# Patient Record
Sex: FEMALE | Race: BLACK OR AFRICAN AMERICAN | Employment: UNEMPLOYED | URBAN - METROPOLITAN AREA
[De-identification: names, ages, dates, MRNs, and addresses within clinical notes are randomized per-mention and may not be internally consistent; named-entity substitution may affect disease eponyms.]

---

## 2016-11-17 LAB
CHLAMYDIA, EXTERNAL: NORMAL
N. GONORRHEA, EXTERNAL: NORMAL

## 2016-12-09 LAB
HBSAG, EXTERNAL: NORMAL
HIV, EXTERNAL: NONREACTIVE
RUBELLA, EXTERNAL: NORMAL
TYPE, ABO & RH, EXTERNAL: NORMAL

## 2017-03-30 LAB
ANTIBODY SCREEN, EXTERNAL: NORMAL
T. PALLIDUM, EXTERNAL: NORMAL

## 2017-05-26 LAB — GRBS, EXTERNAL: NORMAL

## 2017-06-15 ENCOUNTER — ANESTHESIA (OUTPATIENT)
Dept: LABOR AND DELIVERY | Age: 27
DRG: 540 | End: 2017-06-15
Payer: MEDICAID

## 2017-06-15 ENCOUNTER — HOSPITAL ENCOUNTER (INPATIENT)
Age: 27
LOS: 2 days | Discharge: HOME | DRG: 540 | End: 2017-06-17
Attending: OBSTETRICS & GYNECOLOGY | Admitting: OBSTETRICS & GYNECOLOGY
Payer: MEDICAID

## 2017-06-15 ENCOUNTER — ANESTHESIA EVENT (OUTPATIENT)
Dept: LABOR AND DELIVERY | Age: 27
DRG: 540 | End: 2017-06-15
Payer: MEDICAID

## 2017-06-15 LAB
ABO + RH BLD: NORMAL
BASOPHILS # BLD AUTO: 0 K/UL (ref 0–0.1)
BASOPHILS # BLD: 0 % (ref 0–1)
BLOOD GROUP ANTIBODIES SERPL: NORMAL
EOSINOPHIL # BLD: 0 K/UL (ref 0–0.4)
EOSINOPHIL NFR BLD: 1 % (ref 0–7)
ERYTHROCYTE [DISTWIDTH] IN BLOOD BY AUTOMATED COUNT: 13.7 % (ref 11.5–14.5)
HCT VFR BLD AUTO: 34.3 % (ref 35–47)
HGB BLD-MCNC: 11.6 G/DL (ref 11.5–16)
LYMPHOCYTES # BLD AUTO: 26 % (ref 12–49)
LYMPHOCYTES # BLD: 1.8 K/UL (ref 0.8–3.5)
MCH RBC QN AUTO: 29 PG (ref 26–34)
MCHC RBC AUTO-ENTMCNC: 33.8 G/DL (ref 30–36.5)
MCV RBC AUTO: 85.8 FL (ref 80–99)
MONOCYTES # BLD: 0.6 K/UL (ref 0–1)
MONOCYTES NFR BLD AUTO: 8 % (ref 5–13)
NEUTS SEG # BLD: 4.7 K/UL (ref 1.8–8)
NEUTS SEG NFR BLD AUTO: 65 % (ref 32–75)
PLATELET # BLD AUTO: 161 K/UL (ref 150–400)
RBC # BLD AUTO: 4 M/UL (ref 3.8–5.2)
SPECIMEN EXP DATE BLD: NORMAL
WBC # BLD AUTO: 7.2 K/UL (ref 3.6–11)

## 2017-06-15 PROCEDURE — 74011000250 HC RX REV CODE- 250

## 2017-06-15 PROCEDURE — 74011000258 HC RX REV CODE- 258

## 2017-06-15 PROCEDURE — 75410000003 HC RECOV DEL/VAG/CSECN EA 0.5 HR

## 2017-06-15 PROCEDURE — 77030003666 HC NDL SPINAL BD -A: Performed by: ANESTHESIOLOGY

## 2017-06-15 PROCEDURE — 76010000392 HC C SECN EA ADDL 0.5 HR: Performed by: OBSTETRICS & GYNECOLOGY

## 2017-06-15 PROCEDURE — 86900 BLOOD TYPING SEROLOGIC ABO: CPT | Performed by: OBSTETRICS & GYNECOLOGY

## 2017-06-15 PROCEDURE — 65410000002 HC RM PRIVATE OB

## 2017-06-15 PROCEDURE — 74011250636 HC RX REV CODE- 250/636

## 2017-06-15 PROCEDURE — 74011250637 HC RX REV CODE- 250/637: Performed by: OBSTETRICS & GYNECOLOGY

## 2017-06-15 PROCEDURE — 77030032490 HC SLV COMPR SCD KNE COVD -B

## 2017-06-15 PROCEDURE — 85025 COMPLETE CBC W/AUTO DIFF WBC: CPT | Performed by: OBSTETRICS & GYNECOLOGY

## 2017-06-15 PROCEDURE — 36415 COLL VENOUS BLD VENIPUNCTURE: CPT | Performed by: OBSTETRICS & GYNECOLOGY

## 2017-06-15 PROCEDURE — 77030020061 HC IV BLD WRMR ADMIN SET 3M -B

## 2017-06-15 PROCEDURE — 76060000078 HC EPIDURAL ANESTHESIA: Performed by: OBSTETRICS & GYNECOLOGY

## 2017-06-15 PROCEDURE — 75410000002 HC LABOR FEE PER 1 HR

## 2017-06-15 PROCEDURE — 74011250636 HC RX REV CODE- 250/636: Performed by: OBSTETRICS & GYNECOLOGY

## 2017-06-15 PROCEDURE — 77030034849

## 2017-06-15 PROCEDURE — 77030014125 HC TY EPDRL BBMI -B: Performed by: ANESTHESIOLOGY

## 2017-06-15 PROCEDURE — 76010000391 HC C SECN FIRST 1 HR: Performed by: OBSTETRICS & GYNECOLOGY

## 2017-06-15 PROCEDURE — 74011000258 HC RX REV CODE- 258: Performed by: OBSTETRICS & GYNECOLOGY

## 2017-06-15 PROCEDURE — 77010026065 HC OXYGEN MINIMUM MEDICAL AIR

## 2017-06-15 PROCEDURE — 76060000033 HC ANESTHESIA 1 TO 1.5 HR: Performed by: OBSTETRICS & GYNECOLOGY

## 2017-06-15 RX ORDER — NALOXONE HYDROCHLORIDE 0.4 MG/ML
0.2 INJECTION, SOLUTION INTRAMUSCULAR; INTRAVENOUS; SUBCUTANEOUS
Status: DISCONTINUED | OUTPATIENT
Start: 2017-06-15 | End: 2017-06-17 | Stop reason: HOSPADM

## 2017-06-15 RX ORDER — DIPHENHYDRAMINE HCL 25 MG
25 CAPSULE ORAL
Status: DISCONTINUED | OUTPATIENT
Start: 2017-06-15 | End: 2017-06-17 | Stop reason: HOSPADM

## 2017-06-15 RX ORDER — SODIUM CHLORIDE, SODIUM LACTATE, POTASSIUM CHLORIDE, CALCIUM CHLORIDE 600; 310; 30; 20 MG/100ML; MG/100ML; MG/100ML; MG/100ML
1000 INJECTION, SOLUTION INTRAVENOUS CONTINUOUS
Status: DISCONTINUED | OUTPATIENT
Start: 2017-06-15 | End: 2017-06-15 | Stop reason: HOSPADM

## 2017-06-15 RX ORDER — ACETAMINOPHEN 325 MG/1
650 TABLET ORAL
Status: DISCONTINUED | OUTPATIENT
Start: 2017-06-15 | End: 2017-06-17 | Stop reason: HOSPADM

## 2017-06-15 RX ORDER — DOCUSATE SODIUM 100 MG/1
100 CAPSULE, LIQUID FILLED ORAL 2 TIMES DAILY
Status: DISCONTINUED | OUTPATIENT
Start: 2017-06-15 | End: 2017-06-17 | Stop reason: HOSPADM

## 2017-06-15 RX ORDER — SODIUM CHLORIDE 0.9 % (FLUSH) 0.9 %
5-10 SYRINGE (ML) INJECTION EVERY 8 HOURS
Status: DISCONTINUED | OUTPATIENT
Start: 2017-06-15 | End: 2017-06-15 | Stop reason: HOSPADM

## 2017-06-15 RX ORDER — SODIUM CHLORIDE, SODIUM LACTATE, POTASSIUM CHLORIDE, CALCIUM CHLORIDE 600; 310; 30; 20 MG/100ML; MG/100ML; MG/100ML; MG/100ML
125 INJECTION, SOLUTION INTRAVENOUS CONTINUOUS
Status: DISCONTINUED | OUTPATIENT
Start: 2017-06-15 | End: 2017-06-17 | Stop reason: HOSPADM

## 2017-06-15 RX ORDER — SODIUM CHLORIDE, SODIUM LACTATE, POTASSIUM CHLORIDE, CALCIUM CHLORIDE 600; 310; 30; 20 MG/100ML; MG/100ML; MG/100ML; MG/100ML
INJECTION, SOLUTION INTRAVENOUS
Status: DISCONTINUED | OUTPATIENT
Start: 2017-06-15 | End: 2017-06-15 | Stop reason: HOSPADM

## 2017-06-15 RX ORDER — NALOXONE HYDROCHLORIDE 0.4 MG/ML
0.4 INJECTION, SOLUTION INTRAMUSCULAR; INTRAVENOUS; SUBCUTANEOUS AS NEEDED
Status: DISCONTINUED | OUTPATIENT
Start: 2017-06-15 | End: 2017-06-17 | Stop reason: HOSPADM

## 2017-06-15 RX ORDER — MORPHINE SULFATE 0.5 MG/ML
INJECTION, SOLUTION EPIDURAL; INTRATHECAL; INTRAVENOUS AS NEEDED
Status: DISCONTINUED | OUTPATIENT
Start: 2017-06-15 | End: 2017-06-15 | Stop reason: HOSPADM

## 2017-06-15 RX ORDER — SODIUM CHLORIDE 0.9 % (FLUSH) 0.9 %
5-10 SYRINGE (ML) INJECTION AS NEEDED
Status: DISCONTINUED | OUTPATIENT
Start: 2017-06-15 | End: 2017-06-15 | Stop reason: HOSPADM

## 2017-06-15 RX ORDER — IBUPROFEN 400 MG/1
800 TABLET ORAL EVERY 8 HOURS
Status: DISCONTINUED | OUTPATIENT
Start: 2017-06-15 | End: 2017-06-17 | Stop reason: HOSPADM

## 2017-06-15 RX ORDER — ACETAMINOPHEN 10 MG/ML
INJECTION, SOLUTION INTRAVENOUS AS NEEDED
Status: DISCONTINUED | OUTPATIENT
Start: 2017-06-15 | End: 2017-06-15 | Stop reason: HOSPADM

## 2017-06-15 RX ORDER — BUPIVACAINE HYDROCHLORIDE 7.5 MG/ML
INJECTION, SOLUTION EPIDURAL; RETROBULBAR AS NEEDED
Status: DISCONTINUED | OUTPATIENT
Start: 2017-06-15 | End: 2017-06-15 | Stop reason: HOSPADM

## 2017-06-15 RX ORDER — ONDANSETRON 2 MG/ML
INJECTION INTRAMUSCULAR; INTRAVENOUS AS NEEDED
Status: DISCONTINUED | OUTPATIENT
Start: 2017-06-15 | End: 2017-06-15 | Stop reason: HOSPADM

## 2017-06-15 RX ORDER — SIMETHICONE 80 MG
80 TABLET,CHEWABLE ORAL AS NEEDED
Status: DISCONTINUED | OUTPATIENT
Start: 2017-06-15 | End: 2017-06-17 | Stop reason: HOSPADM

## 2017-06-15 RX ORDER — SODIUM CHLORIDE 0.9 % (FLUSH) 0.9 %
5-10 SYRINGE (ML) INJECTION EVERY 8 HOURS
Status: DISCONTINUED | OUTPATIENT
Start: 2017-06-15 | End: 2017-06-17 | Stop reason: HOSPADM

## 2017-06-15 RX ORDER — OXYTOCIN/RINGER'S LACTATE 20/1000 ML
125-500 PLASTIC BAG, INJECTION (ML) INTRAVENOUS ONCE
Status: ACTIVE | OUTPATIENT
Start: 2017-06-15 | End: 2017-06-15

## 2017-06-15 RX ORDER — OXYCODONE AND ACETAMINOPHEN 5; 325 MG/1; MG/1
1 TABLET ORAL
Status: DISCONTINUED | OUTPATIENT
Start: 2017-06-15 | End: 2017-06-17 | Stop reason: HOSPADM

## 2017-06-15 RX ORDER — SODIUM CHLORIDE 0.9 % (FLUSH) 0.9 %
5-10 SYRINGE (ML) INJECTION AS NEEDED
Status: DISCONTINUED | OUTPATIENT
Start: 2017-06-15 | End: 2017-06-17 | Stop reason: HOSPADM

## 2017-06-15 RX ORDER — OXYTOCIN 10 [USP'U]/ML
INJECTION, SOLUTION INTRAMUSCULAR; INTRAVENOUS AS NEEDED
Status: DISCONTINUED | OUTPATIENT
Start: 2017-06-15 | End: 2017-06-15 | Stop reason: HOSPADM

## 2017-06-15 RX ADMIN — ONDANSETRON 4 MG: 2 INJECTION INTRAMUSCULAR; INTRAVENOUS at 08:38

## 2017-06-15 RX ADMIN — IBUPROFEN 800 MG: 400 TABLET ORAL at 17:39

## 2017-06-15 RX ADMIN — MORPHINE SULFATE 0.5 MG: 0.5 INJECTION, SOLUTION EPIDURAL; INTRATHECAL; INTRAVENOUS at 08:16

## 2017-06-15 RX ADMIN — SODIUM CHLORIDE, SODIUM LACTATE, POTASSIUM CHLORIDE, CALCIUM CHLORIDE: 600; 310; 30; 20 INJECTION, SOLUTION INTRAVENOUS at 09:06

## 2017-06-15 RX ADMIN — DOCUSATE SODIUM 100 MG: 100 CAPSULE, LIQUID FILLED ORAL at 17:39

## 2017-06-15 RX ADMIN — SODIUM CHLORIDE, SODIUM LACTATE, POTASSIUM CHLORIDE, AND CALCIUM CHLORIDE 1000 ML: 600; 310; 30; 20 INJECTION, SOLUTION INTRAVENOUS at 06:35

## 2017-06-15 RX ADMIN — BUPIVACAINE HYDROCHLORIDE 1.4 ML: 7.5 INJECTION, SOLUTION EPIDURAL; RETROBULBAR at 08:16

## 2017-06-15 RX ADMIN — SODIUM CHLORIDE, SODIUM LACTATE, POTASSIUM CHLORIDE, AND CALCIUM CHLORIDE 1000 ML: 600; 310; 30; 20 INJECTION, SOLUTION INTRAVENOUS at 07:39

## 2017-06-15 RX ADMIN — SODIUM CHLORIDE, SODIUM LACTATE, POTASSIUM CHLORIDE, AND CALCIUM CHLORIDE 125 ML/HR: 600; 310; 30; 20 INJECTION, SOLUTION INTRAVENOUS at 22:43

## 2017-06-15 RX ADMIN — OXYTOCIN 20 UNITS: 10 INJECTION, SOLUTION INTRAMUSCULAR; INTRAVENOUS at 08:38

## 2017-06-15 RX ADMIN — SODIUM CHLORIDE, SODIUM LACTATE, POTASSIUM CHLORIDE, CALCIUM CHLORIDE: 600; 310; 30; 20 INJECTION, SOLUTION INTRAVENOUS at 08:05

## 2017-06-15 RX ADMIN — ACETAMINOPHEN 1000 MG: 10 INJECTION, SOLUTION INTRAVENOUS at 08:54

## 2017-06-15 RX ADMIN — SODIUM CHLORIDE, SODIUM LACTATE, POTASSIUM CHLORIDE, AND CALCIUM CHLORIDE 125 ML/HR: 600; 310; 30; 20 INJECTION, SOLUTION INTRAVENOUS at 14:48

## 2017-06-15 RX ADMIN — CEFAZOLIN SODIUM 1 G: 1 INJECTION, POWDER, FOR SOLUTION INTRAMUSCULAR; INTRAVENOUS at 07:39

## 2017-06-15 NOTE — OP NOTES
Operative Note    Name: Jessica Lloyd   Medical Record Number: [id number]   YOB: 1990  Today's Date: Olivia 15, 2017      Pre-operative Diagnosis: IUP at 39/0wks  Previous C/Section    Post-operative Diagnosis:  IUP at 39/0wks  Previous C/Section    Procedure: Repeat low transverse  section     Surgeon(s):  MD Marcelo Whitfield MD    Anesthesia: Spinal    Prophylactic Antibiotics: Ancef    DVT Prophylaxis: Sequential Compression Devices     Fetal Description: yanez     Birth Information:   Information for the patient's :  Yakelin Styles [[id number]]   Delivery of a 2.945 kg Male [2] infant on 6/15/2017 at 8:37 AM. Apgars were 9 and . Umbilical Cord:     Umbilical Cord Events:     Placenta:  removal with  appearance. Amniotic Fluid Volume:        Amniotic Fluid Description:           Placenta:  expressed Expressed    Estimated Blood Loss (ml):  800mL    Specimens: None           Complications:  none    Procedure Detail:      After proper patient identification and consent, the patient was taken to the operating room, where spinal anesthesia was administered. The patient was placed in the dorsal supine position with a leftward tilt. A larsen catheter was placed using sterile technique. SCDs were applied and activated. The patient was prepped and draped in the normal sterile fashion. Anesthesia was tested and found to be adequate. A time-out was called and all parties were in agreement. The abdomen was entered using the Pfannenstiel technique. There were minimal fascial adhesions. The rectus muscles were adhesed in the midline and taken down sharply. The peritoneum was entered sharply well superior to the bladder without any apparent injury. An Justyn retractor was placed. A bladder flap was created. A low transverse uterine incision was made with the scalpel and extended with blunt finger dissection by pulling in a cephalo-caudid direction.  Amniotomy was performed and the fluid was medium amount clear. The babys head and body was then delivered atraumatically. The cord was clamped and cut and the baby was handed off to Nursing staff in attendance. The placenta was then removed from the uterus. The uterus was curettaged with a moist lap pad and cleared of all clots and debris. The uterine incision was closed with 0 monocryl in a running locking fashion. A second layer of the same suture was imbricated over the first. Excellent hemostasis was assured. The pelvis was liberally irrigated and suctioned. The undersides of the fascia were examined and made hemostatic with cautery. There was a button-hole in the anterior fascial edge. This was repaired with running 0-vicryl. The fascia was re-approximated with running 0-vicryl. The subcutaneous tissue was irrigated and suctioned. It was made hemostatic with cautery. The skin was closed with subcuticular monocryl staples and covered with a sterile dressing. This concluded the procedure. All sponge, instrument, and needle counts were correct times three. Mother and baby were taken to recovery/postpartum room in stable condition.     Mary Azul MD  Olivia 15, 2017  9:31 AM

## 2017-06-15 NOTE — PROGRESS NOTES
TRANSFER - OUT REPORT:    Verbal report given to [employer], RN(name) on Radha Miller  being transferred to MIU(unit) for routine post - op       Report consisted of patients Situation, Background, Assessment and   Recommendations(SBAR). Information from the following report(s) SBAR, Kardex, OR Summary, Procedure Summary, Intake/Output, MAR and Recent Results was reviewed with the receiving nurse. Lines:   Peripheral IV 06/15/17 Left Hand (Active)   Site Assessment Clean, dry, & intact 6/15/2017  6:37 AM   Phlebitis Assessment 0 6/15/2017  6:37 AM   Infiltration Assessment 0 6/15/2017  6:37 AM   Dressing Status Clean, dry, & intact 6/15/2017  6:37 AM   Dressing Type Tape;Transparent 6/15/2017  6:37 AM   Hub Color/Line Status Pink; Infusing 6/15/2017  6:37 AM   Action Taken Blood drawn 6/15/2017  6:37 AM        Opportunity for questions and clarification was provided.

## 2017-06-15 NOTE — IP AVS SNAPSHOT
Current Discharge Medication List  
  
START taking these medications Dose & Instructions Dispensing Information Comments Morning Noon Evening Bedtime  
 ferrous sulfate 142 mg (45 mg iron) ER tablet Commonly known as:  SLOW FE  
   
Your last dose was: Your next dose is:    
   
   
 Dose:  142 mg Take 1 Tab by mouth Daily (before breakfast). Quantity:  30 Tab Refills:  1  
     
   
   
   
  
 ibuprofen 600 mg tablet Commonly known as:  MOTRIN Your last dose was: Your next dose is:    
   
   
 Dose:  600 mg Take 1 Tab by mouth every six (6) hours as needed for Pain for up to 360 days. Quantity:  30 Tab Refills:  0  
     
   
   
   
  
 oxyCODONE-acetaminophen 5-325 mg per tablet Commonly known as:  PERCOCET Your last dose was: Your next dose is:    
   
   
 Dose:  1-2 Tab Take 1-2 Tabs by mouth every four (4) hours as needed for Pain. Max Daily Amount: 12 Tabs. Quantity:  30 Tab Refills:  0 CONTINUE these medications which have NOT CHANGED Dose & Instructions Dispensing Information Comments Morning Noon Evening Bedtime  
 prenatal multivit-ca-min-fe-fa Tab Commonly known as:  PRENATAL VITAMIN Your last dose was: Your next dose is:    
   
   
 Dose:  1 Tab Take 1 Tab by mouth daily. Quantity:  30 Tab Refills:  9 Where to Get Your Medications Information on where to get these meds will be given to you by the nurse or doctor. ! Ask your nurse or doctor about these medications  
  ferrous sulfate 142 mg (45 mg iron) ER tablet  
 ibuprofen 600 mg tablet  
 oxyCODONE-acetaminophen 5-325 mg per tablet

## 2017-06-15 NOTE — PROGRESS NOTES
31 yo  arrived for scheduled repeat . Pt reports positive FM, denies VB or LOF.    0600 - Consents reviewed and signed. 3572 - EFM and toco placed. 1923 - abdomen clipped and CHG wipes by Dulce Stubbs RN.    6393 - Dr. Cheryl Quiorz in room    0700 - lab called, states T&S hemolyzed, requiring repeat. Labs drawn peripherally (with extra green and purple top) and sent to lab.    0703 - Verbal bedside shift report to [address] [facility]. Turned over care of pt at this time.

## 2017-06-15 NOTE — L&D DELIVERY NOTE
Delivery Summary  Patient: Shankar Ray             Circumcision:   desires  Additional Delivery Comments - Uncomplicated repeat CS. Male (Viky Gentile) 6-7. Information for the patient's :  Lum Rule [[id number]]       Labor Events:    Labor: No   Rupture Date:     Rupture Time:     Rupture Type     Amniotic Fluid Volume:      Amniotic Fluid Description:       Induction:         Augmentation:     Labor Events:       Cervical Ripening:     None     Delivery Events:  Episiotomy: None   Laceration(s): None     Repaired:      Number of Repair Packets:     Suture Type and Size:       Estimated Blood Loss (ml):  ml       Delivery Date: 6/15/2017    Delivery Time: 8:37 AM  Delivery Type: , Low Transverse  Sex:  Male     Gestational Age: 39w0d   Delivery Clinician:  Selma Joe  Living Status: Yes   Delivery Location: OR            APGARS  One minute Five minutes Ten minutes   Skin color: 1   1        Heart rate: 2   2        Grimace: 2   2        Muscle tone: 2   2        Breathin           Totals: 9               Presentation: Vertex    Position:        Resuscitation Method:  Suctioning-bulb; Tactile Stimulation     Meconium Stained: None      Cord Vessels: 3 Vessels      Cord Events:    Cord Blood Sent?:  No    Blood Gases Sent?:  No    Placenta:  Date/Time: 6/15  8:38 AM  Removal: Manual Removal      Appearance: Intact     [place] Measurements:  Birth Weight: 2.945 kg      Birth Length: 49.5 cm      Head Circumference: 35 cm      Chest Circumference: 32.5 cm     Abdominal Girth: 28.5 cm    Other Providers:   Joy HOANG;RONALD ELI;MYRNA HARDY;;;DEANGELO CORRALES;MARGE WAGGONER;;;BELA HUA;BETTY GERMAIN, Obstetrician;Primary Nurse;Primary [place] Nurse;Nicu Nurse;Neonatologist;Anesthesiologist;Crna;Nurse Practitioner;Midwife;Nursery Nurse;Scrub Tech           Cord pH:  none    Episiotomy: None   Laceration(s): None      Estimated Blood Loss (ml):     Labor Events  Method:         Augmentation:     Cervical Ripening:       None        Operative Vaginal Delivery - none    Group B Strep:   Lab Results   Component Value Date/Time    GrBStrep, External neg 2017     Information for the patient's :  Veronica Pelayo lauri [[id number]]   No results found for: ABORH, PCTABR, PCTDIG, BILI, ABORHEXT, ABORH    No results found for: APH, APCO2, APO2, AHCO3, ABEC, ABDC, O2ST, EPHV, PCO2V, PO2V, HCO3V, EBEV, EBDV, SITE, RSCOM

## 2017-06-15 NOTE — ROUTINE PROCESS
Bedside and Verbal shift change report given to SURINDER Gonzales RN (oncoming nurse) by Anabel Kelley RN (offgoing nurse). Report included the following information SBAR.

## 2017-06-15 NOTE — IP AVS SNAPSHOT
[address] [facility] 
[phone] Patient: Rafal Stewart MRN: [id number] ZO:[birth year] You are allergic to the following Allergen Reactions Naproxen Swelling Recent Documentation Height Weight Breastfeeding? BMI OB Status Smoking Status 1.575 m 79.8 kg Unknown 32.19 kg/m2 Recent pregnancy Never Smoker Emergency Contacts Name Discharge Info Relation Home Work Mobile KielylFlores kaye  Parent [1] [phone] Flores Del Toro DISCHARGE CAREGIVER [3] Parent [1] [phone] About your hospitalization You were admitted on:  Olivia 15, 2017 You last received care in the:  [facility] You were discharged on:  June 17, 2017 Unit phone number:  [phone] Why you were hospitalized Your primary diagnosis was:  Not on File Your diagnoses also included:  Pregnancy Providers Seen During Your Hospitalizations Provider Role Specialty Primary office phone Lenard Bains MD Attending Provider Obstetrics & Gynecology [phone] Your Primary Care Physician (PCP) Primary Care Physician Office Phone Office Fax OTHER, PHYS ** None ** ** None ** Follow-up Information Follow up With Details Comments Contact Info My Ruffin MD   Patient can only remember the practice name and not the physician Current Discharge Medication List  
  
START taking these medications Dose & Instructions Dispensing Information Comments Morning Noon Evening Bedtime  
 ferrous sulfate 142 mg (45 mg iron) ER tablet Commonly known as:  SLOW FE  
   
Your last dose was: Your next dose is:    
   
   
 Dose:  142 mg Take 1 Tab by mouth Daily (before breakfast). Quantity:  30 Tab Refills:  1  
     
   
   
   
  
 ibuprofen 600 mg tablet Commonly known as:  MOTRIN Your last dose was: Your next dose is:    
   
   
 Dose:  600 mg Take 1 Tab by mouth every six (6) hours as needed for Pain for up to 360 days. Quantity:  30 Tab Refills:  0  
     
   
   
   
  
 oxyCODONE-acetaminophen 5-325 mg per tablet Commonly known as:  PERCOCET Your last dose was: Your next dose is:    
   
   
 Dose:  1-2 Tab Take 1-2 Tabs by mouth every four (4) hours as needed for Pain. Max Daily Amount: 12 Tabs. Quantity:  30 Tab Refills:  0 CONTINUE these medications which have NOT CHANGED Dose & Instructions Dispensing Information Comments Morning Noon Evening Bedtime  
 prenatal multivit-ca-min-fe-fa Tab Commonly known as:  PRENATAL VITAMIN Your last dose was: Your next dose is:    
   
   
 Dose:  1 Tab Take 1 Tab by mouth daily. Quantity:  30 Tab Refills:  9 Where to Get Your Medications Information on where to get these meds will be given to you by the nurse or doctor. ! Ask your nurse or doctor about these medications  
  ferrous sulfate 142 mg (45 mg iron) ER tablet  
 ibuprofen 600 mg tablet  
 oxyCODONE-acetaminophen 5-325 mg per tablet Discharge Instructions  Section: What to Expect at [facility] Your Recovery A  section, or , is surgery to deliver your baby through a cut, called an incision, that the doctor makes in your lower belly and uterus. You may have some pain in your lower belly and need pain medicine for 1 to 2 weeks. You can expect some vaginal bleeding for several weeks. You will probably need about 6 weeks to fully recover. It is important to take it easy while the incision is healing. Avoid heavy lifting, strenuous activities, or exercises that strain the belly muscles while you are recovering. Ask a family member or friend for help with housework, cooking, and shopping. This care sheet gives you a general idea about how long it will take for you to recover. But each person recovers at a different pace. Follow the steps below to get better as quickly as possible. How can you care for yourself at home? Activity · Rest when you feel tired. Getting enough sleep will help you recover. · Try to walk each day. Start by walking a little more than you did the day before. Bit by bit, increase the amount you walk. Walking boosts blood flow and helps prevent pneumonia, constipation, and blood clots. · Avoid strenuous activities, such as bicycle riding, jogging, weightlifting, and aerobic exercise, for 6 weeks or until your doctor says it is okay. · Until your doctor says it is okay, do not lift anything heavier than your baby. · Do not do sit-ups or other exercises that strain the belly muscles for 6 weeks or until your doctor says it is okay. · Hold a pillow over your incision when you cough or take deep breaths. This will support your belly and decrease your pain. · You may shower as usual. Pat the incision dry when you are done. · You will have some vaginal bleeding. Wear sanitary pads. Do not douche or use tampons until your doctor says it is okay. · Ask your doctor when you can drive again. · You will probably need to take at least 6 weeks off work. It depends on the type of work you do and how you feel. · Ask your doctor when it is okay for you to have sex. Diet · You can eat your normal diet. If your stomach is upset, try bland, low-fat foods like plain rice, broiled chicken, toast, and yogurt. · Drink plenty of fluids (unless your doctor tells you not to). · You may notice that your bowel movements are not regular right after your surgery. This is common. Try to avoid constipation and straining with bowel movements. You may want to take a fiber supplement every day.  If you have not had a bowel movement after a couple of days, ask your doctor about taking a mild laxative. · If you are breastfeeding, do not drink any alcohol. Medicines · Your doctor will tell you if and when you can restart your medicines. He or she will also give you instructions about taking any new medicines. · If you take blood thinners, such as warfarin (Coumadin), clopidogrel (Plavix), or aspirin, be sure to talk to your doctor. He or she will tell you if and when to start taking those medicines again. Make sure that you understand exactly what your doctor wants you to do. · Take pain medicines exactly as directed. ¨ If the doctor gave you a prescription medicine for pain, take it as prescribed. ¨ If you are not taking a prescription pain medicine, ask your doctor if you can take an over-the-counter medicine. · If you think your pain medicine is making you sick to your stomach: 
¨ Take your medicine after meals (unless your doctor has told you not to). ¨ Ask your doctor for a different pain medicine. · If your doctor prescribed antibiotics, take them as directed. Do not stop taking them just because you feel better. You need to take the full course of antibiotics. Incision care · If you have strips of tape on the incision, leave the tape on for a week or until it falls off. · Wash the area daily with warm, soapy water, and pat it dry. Don't use hydrogen peroxide or alcohol, which can slow healing. You may cover the area with a gauze bandage if it weeps or rubs against clothing. Change the bandage every day. · Keep the area clean and dry. Other instructions · If you breastfeed your baby, you may be more comfortable while you are healing if you place the baby so that he or she is not resting on your belly. Try tucking your baby under your arm, with his or her body along the side you will be feeding on. Support your baby's upper body with your arm.  With that hand you can control your baby's head to bring his or her mouth to your breast. This is sometimes called the football hold. Follow-up care is a key part of your treatment and safety. Be sure to make and go to all appointments, and call your doctor if you are having problems. It's also a good idea to know your test results and keep a list of the medicines you take. When should you call for help? Call 911 anytime you think you may need emergency care. For example, call if: 
· You passed out (lost consciousness). · You have symptoms of a blood clot in your lung (called a pulmonary embolism). These may include: 
¨ Sudden chest pain. ¨ Trouble breathing. ¨ Coughing up blood. · You have thoughts of harming yourself, your baby, or another person. Call your doctor now or seek immediate medical care if: 
· You have severe vaginal bleeding. This means that you are soaking through a pad every hour for 2 or more hours. · You are dizzy or lightheaded, or you feel like you may faint. · You have new or more belly pain. · You have loose stitches, or your incision comes open. · You have symptoms of infection, such as: 
¨ Increased pain, swelling, warmth, or redness. ¨ Red streaks leading from the incision. ¨ Pus draining from the incision. ¨ A fever. · You have symptoms of a blood clot in your leg (called a deep vein thrombosis), such as: 
¨ Pain in your calf, back of the knee, thigh, or groin. ¨ Redness and swelling in your leg or groin. Watch closely for changes in your health, and be sure to contact your doctor if: 
· You feel sad, anxious, or hopeless for more than a few days. · You do not get better as expected. Where can you learn more? Go to http://sandra-halima.info/. Enter M806 in the search box to learn more about \" Section: What to Expect at Home. \" Current as of: May 30, 2016 Content Version: 11.2 © 6645-2460 [employer].  Care instructions adapted under license by [facility] [address] (which disclaims liability or warranty for this information). If you have questions about a medical condition or this instruction, always ask your healthcare professional. [address] any warranty or liability for your use of this information. POST DELIVERY DISCHARGE INSTRUCTIONS Name: Emelina Kolb YOB: 1990 Primary Diagnosis: Active Problems: * No active hospital problems. * General:  
 
Diet/Diet Restrictions: 
· Eight 8-ounce glasses of fluid daily (water, juices); avoid excessive caffeine intake. · Meals/snacks as desired which are high in fiber and carbohydrates and low in fat and cholesterol. Medications:  
 
 
 
Physical Activity / Restrictions / Safety: · Avoid heavy lifting, no more that 8 lbs. For 2-3 weeks;  
· Limit use of stairs to 2 times daily for the first week home. · No driving for one week. · Avoid intercourse 4-6 weeks, no douching or tampon use. · Check with obstetrician before starting or resuming an exercise program.   
 
Discharge Instructions/Special Treatment/Home Care Needs:  
 
· Continue prenatal vitamins. · Continue to use squirt bottle with warm water on your episiotomy after each bathroom use until bleeding stops. · If steri-strips applied to your incision, remove in 7-10 days. Call your doctor for the following: · Fever over 101 degrees by mouth. · Vaginal bleeding heavier than a normal menstrual period or clots larger than a golf ball. · Red streaks or increased swelling of legs, painful red streaks on your breast. 
· Painful urination, constipation and increased pain or swelling or discharge with your incision. · If you feel extremely anxious or overwhelmed. · If you have thoughts of harming yourself and/or your baby. Pain Management:  
 
· Take Acetaminophen (Tylenol) or Ibuprofen (Advil, Motrin), as directed for pain. · Use a warm Sitz bath 3 times daily to relieve episiotomy or hemorrhoidal discomfort. · For hemorrhoidal discomfort, use Tucks and Anusol cream as needed and directed. · Heating pad to  incision as needed. Follow-Up Care:  
 
Appointment with MD: Follow-up Appointments Procedures  FOLLOW UP VISIT Appointment in: 6 Weeks With Dr. Funmilayo Guillen With Dr. Funmilayo Guillen Standing Status:   Standing Number of Occurrences:   1 Order Specific Question:   Appointment in Answer:   6 Weeks Telephone number: [phone] Signed By: Julia Childers MD                                                                                                   Date: 2017 Time: 8:19 AM 
 
 
Discharge Orders None [email or web address] Announcement We are excited to announce that we are making your provider's discharge notes available to you in [email or web address]. You will see these notes when they are completed and signed by the physician that discharged you from your recent hospital stay. If you have any questions or concerns about any information you see in [email or web address], please call the Health Information Department where you were seen or reach out to your Primary Care Provider for more information about your plan of care. Introducing [facility]! Berto Disla introduces [email or web address] patient portal. Now you can access parts of your medical record, email your doctor's office, and request medication refills online. 1. In your internet browser, go to [email or web address] 2. Click on the First Time User? Click Here link in the Sign In box. You will see the New Member Sign Up page. 3. Enter your [email or web address] Access Code exactly as it appears below. You will not need to use this code after youve completed the sign-up process. If you do not sign up before the expiration date, you must request a new code. · [email or web address] Access Code: [email or web address] Expires: 9/15/2017  9:02 AM 
 
 4. Enter the last four digits of your Social Security Number (xxxx) and Date of Birth (mm/dd/yyyy) as indicated and click Submit. You will be taken to the next sign-up page. 5. Create a [employer] ID. This will be your [employer] login ID and cannot be changed, so think of one that is secure and easy to remember. 6. Create a [employer] password. You can change your password at any time. 7. Enter your Password Reset Question and Answer. This can be used at a later time if you forget your password. 8. Enter your e-mail address. You will receive e-mail notification when new information is available in [address] 9. Click Sign Up. You can now view and download portions of your medical record. 10. Click the Download Summary menu link to download a portable copy of your medical information. If you have questions, please visit the Frequently Asked Questions section of the [employer] website. Remember, [employer] is NOT to be used for urgent needs. For medical emergencies, dial 911. Now available from your iPhone and Android! General Information Please provide this summary of care documentation to your next provider. Patient Signature:  ____________________________________________________________ Date:  ____________________________________________________________  
  
Fransisco Cons Provider Signature:  ____________________________________________________________ Date:  ____________________________________________________________

## 2017-06-15 NOTE — ANESTHESIA POSTPROCEDURE EVALUATION
Post-Anesthesia Evaluation and Assessment    Patient: Raymona Bloch MRN: [id number]  SSN: [id number]    YOB: 1990  Age: 32 y.o. Sex: female       Cardiovascular Function/Vital Signs  Visit Vitals    /58    Pulse 75    Temp 36.4 °C (97.5 °F)    Resp 16    Ht 5' 2\" (1.575 m)    Wt 79.8 kg (176 lb)    SpO2 100%    Breastfeeding Unknown    BMI 32.19 kg/m2       Patient is status post spinal anesthesia for Procedure(s):   SECTION. Nausea/Vomiting: None    Postoperative hydration reviewed and adequate. Pain:  Pain Scale 1: Numeric (0 - 10) (06/15/17 1016)  Pain Intensity 1: 0 (06/15/17 1016)   Managed    Neurological Status: At baseline    Mental Status and Level of Consciousness: Arousable    Pulmonary Status:   O2 Device: Room air (06/15/17 0909)   Adequate oxygenation and airway patent    Complications related to anesthesia: None    Post-anesthesia assessment completed.  No concerns    Signed By: Zari Stephen MD     Olivia 15, 2017

## 2017-06-15 NOTE — IP AVS SNAPSHOT
Summary of Care Report The Summary of Care report has been created to help improve care coordination. Users with access to [employer] or [address] (Web-based application) may access additional patient information including the Discharge Summary. If you are not currently a [address] user and need more information, please call the number listed below in the [place] [address] section and ask to be connected with Medical Records. Facility Information Name Address Phone [address] [address] [phone] Patient Information Patient Name Sex  [facility] ([id number]) Female 1990 Discharge Information Admitting Provider Service Area Unit Joan Young MD / [phone] [address] [facility] 3 Mother Infant / [phone] Discharge Provider Discharge Date/Time Discharge Disposition Destination (none) 2017 (Pending) AHR (none) Patient Language Language ENGLISH [13] Hospital Problems as of 2017  Reviewed: 10/28/2016 11:55 AM by Parveen Krause MD  
 None Non-Hospital Problems as of 2017  Reviewed: 10/28/2016 11:55 AM by Parveen Krause MD  
 None You are allergic to the following Allergen Reactions Naproxen Swelling Current Discharge Medication List  
  
START taking these medications Dose & Instructions Dispensing Information Comments  
 ferrous sulfate 142 mg (45 mg iron) ER tablet Commonly known as:  SLOW FE  
 Dose:  142 mg Take 1 Tab by mouth Daily (before breakfast). Quantity:  30 Tab Refills:  1  
   
 ibuprofen 600 mg tablet Commonly known as:  MOTRIN Dose:  600 mg Take 1 Tab by mouth every six (6) hours as needed for Pain for up to 360 days. Quantity:  30 Tab Refills:  0  
   
 oxyCODONE-acetaminophen 5-325 mg per tablet Commonly known as:  PERCOCET Dose:  1-2 Tab Take 1-2 Tabs by mouth every four (4) hours as needed for Pain. Max Daily Amount: 12 Tabs. Quantity:  30 Tab Refills:  0 CONTINUE these medications which have NOT CHANGED Dose & Instructions Dispensing Information Comments  
 prenatal multivit-ca-min-fe-fa Tab Commonly known as:  PRENATAL VITAMIN Dose:  1 Tab Take 1 Tab by mouth daily. Quantity:  30 Tab Refills:  9 Surgery Information ID Date/Time Status Primary Surgeon All Procedures Location [id number] 6/15/2017 0800 [id number] Michi Oviedo MD  SECTION [facility] L[facility]D OR Follow-up Information Follow up With Details Comments Contact Info My Ruffin MD   Patient can only remember the practice name and not the physician Discharge Instructions  Section: What to Expect at [facility] Your Recovery A  section, or , is surgery to deliver your baby through a cut, called an incision, that the doctor makes in your lower belly and uterus. You may have some pain in your lower belly and need pain medicine for 1 to 2 weeks. You can expect some vaginal bleeding for several weeks. You will probably need about 6 weeks to fully recover. It is important to take it easy while the incision is healing. Avoid heavy lifting, strenuous activities, or exercises that strain the belly muscles while you are recovering. Ask a family member or friend for help with housework, cooking, and shopping. This care sheet gives you a general idea about how long it will take for you to recover. But each person recovers at a different pace. Follow the steps below to get better as quickly as possible. How can you care for yourself at home? Activity · Rest when you feel tired. Getting enough sleep will help you recover. · Try to walk each day.  Start by walking a little more than you did the day before. Bit by bit, increase the amount you walk. Walking boosts blood flow and helps prevent pneumonia, constipation, and blood clots. · Avoid strenuous activities, such as bicycle riding, jogging, weightlifting, and aerobic exercise, for 6 weeks or until your doctor says it is okay. · Until your doctor says it is okay, do not lift anything heavier than your baby. · Do not do sit-ups or other exercises that strain the belly muscles for 6 weeks or until your doctor says it is okay. · Hold a pillow over your incision when you cough or take deep breaths. This will support your belly and decrease your pain. · You may shower as usual. Pat the incision dry when you are done. · You will have some vaginal bleeding. Wear sanitary pads. Do not douche or use tampons until your doctor says it is okay. · Ask your doctor when you can drive again. · You will probably need to take at least 6 weeks off work. It depends on the type of work you do and how you feel. · Ask your doctor when it is okay for you to have sex. Diet · You can eat your normal diet. If your stomach is upset, try bland, low-fat foods like plain rice, broiled chicken, toast, and yogurt. · Drink plenty of fluids (unless your doctor tells you not to). · You may notice that your bowel movements are not regular right after your surgery. This is common. Try to avoid constipation and straining with bowel movements. You may want to take a fiber supplement every day. If you have not had a bowel movement after a couple of days, ask your doctor about taking a mild laxative. · If you are breastfeeding, do not drink any alcohol. Medicines · Your doctor will tell you if and when you can restart your medicines. He or she will also give you instructions about taking any new medicines. · If you take blood thinners, such as warfarin (Coumadin), clopidogrel (Plavix), or aspirin, be sure to talk to your doctor.  He or she will tell you if and when to start taking those medicines again. Make sure that you understand exactly what your doctor wants you to do. · Take pain medicines exactly as directed. ¨ If the doctor gave you a prescription medicine for pain, take it as prescribed. ¨ If you are not taking a prescription pain medicine, ask your doctor if you can take an over-the-counter medicine. · If you think your pain medicine is making you sick to your stomach: 
¨ Take your medicine after meals (unless your doctor has told you not to). ¨ Ask your doctor for a different pain medicine. · If your doctor prescribed antibiotics, take them as directed. Do not stop taking them just because you feel better. You need to take the full course of antibiotics. Incision care · If you have strips of tape on the incision, leave the tape on for a week or until it falls off. · Wash the area daily with warm, soapy water, and pat it dry. Don't use hydrogen peroxide or alcohol, which can slow healing. You may cover the area with a gauze bandage if it weeps or rubs against clothing. Change the bandage every day. · Keep the area clean and dry. Other instructions · If you breastfeed your baby, you may be more comfortable while you are healing if you place the baby so that he or she is not resting on your belly. Try tucking your baby under your arm, with his or her body along the side you will be feeding on. Support your baby's upper body with your arm. With that hand you can control your baby's head to bring his or her mouth to your breast. This is sometimes called the football hold. Follow-up care is a key part of your treatment and safety. Be sure to make and go to all appointments, and call your doctor if you are having problems. It's also a good idea to know your test results and keep a list of the medicines you take. When should you call for help? Call 911 anytime you think you may need emergency care. For example, call if: · You passed out (lost consciousness). · You have symptoms of a blood clot in your lung (called a pulmonary embolism). These may include: 
¨ Sudden chest pain. ¨ Trouble breathing. ¨ Coughing up blood. · You have thoughts of harming yourself, your baby, or another person. Call your doctor now or seek immediate medical care if: 
· You have severe vaginal bleeding. This means that you are soaking through a pad every hour for 2 or more hours. · You are dizzy or lightheaded, or you feel like you may faint. · You have new or more belly pain. · You have loose stitches, or your incision comes open. · You have symptoms of infection, such as: 
¨ Increased pain, swelling, warmth, or redness. ¨ Red streaks leading from the incision. ¨ Pus draining from the incision. ¨ A fever. · You have symptoms of a blood clot in your leg (called a deep vein thrombosis), such as: 
¨ Pain in your calf, back of the knee, thigh, or groin. ¨ Redness and swelling in your leg or groin. Watch closely for changes in your health, and be sure to contact your doctor if: 
· You feel sad, anxious, or hopeless for more than a few days. · You do not get better as expected. Where can you learn more? Go to http://sandra-halima.info/. Enter M806 in the search box to learn more about \" Section: What to Expect at Home. \" Current as of: May 30, 2016 Content Version: 11.2 © 7317-9797 [employer]. Care instructions adapted under license by web care [employer] (which disclaims liability or warranty for this information). If you have questions about a medical condition or this instruction, always ask your healthcare professional. [place] [address] any warranty or liability for your use of this information. POST DELIVERY DISCHARGE INSTRUCTIONS Name: Franko Lowers YOB: 1990 Primary Diagnosis: Active Problems: * No active hospital problems. * General: Diet/Diet Restrictions: 
· Eight 8-ounce glasses of fluid daily (water, juices); avoid excessive caffeine intake. · Meals/snacks as desired which are high in fiber and carbohydrates and low in fat and cholesterol. Medications:  
 
 
 
Physical Activity / Restrictions / Safety: · Avoid heavy lifting, no more that 8 lbs. For 2-3 weeks;  
· Limit use of stairs to 2 times daily for the first week home. · No driving for one week. · Avoid intercourse 4-6 weeks, no douching or tampon use. · Check with obstetrician before starting or resuming an exercise program.   
 
Discharge Instructions/Special Treatment/Home Care Needs:  
 
· Continue prenatal vitamins. · Continue to use squirt bottle with warm water on your episiotomy after each bathroom use until bleeding stops. · If steri-strips applied to your incision, remove in 7-10 days. Call your doctor for the following: · Fever over 101 degrees by mouth. · Vaginal bleeding heavier than a normal menstrual period or clots larger than a golf ball. · Red streaks or increased swelling of legs, painful red streaks on your breast. 
· Painful urination, constipation and increased pain or swelling or discharge with your incision. · If you feel extremely anxious or overwhelmed. · If you have thoughts of harming yourself and/or your baby. Pain Management:  
 
· Take Acetaminophen (Tylenol) or Ibuprofen (Advil, Motrin), as directed for pain. · Use a warm Sitz bath 3 times daily to relieve episiotomy or hemorrhoidal discomfort. · For hemorrhoidal discomfort, use Tucks and Anusol cream as needed and directed. · Heating pad to  incision as needed. Follow-Up Care:  
 
Appointment with MD: Follow-up Appointments Procedures  FOLLOW UP VISIT Appointment in: 6 Weeks With Dr. Funmilayo Guillen With Dr. Funmilayo Guillen Standing Status:   Standing Number of Occurrences:   1 Order Specific Question:   Appointment in Answer:   6 Weeks Telephone number: [phone] Signed By: Brenda Munguia MD                                                                                                   Date: 6/17/2017 Time: 8:19 AM 
 
 
Chart Review Routing History No Routing History on File

## 2017-06-15 NOTE — PROGRESS NOTES
Checked in on patient for hourly round and patient teary-eyed/wiping tears. Family/friends present. [de-identified] father stepped out before RN round. RN asked if she was ok/if patient needed anything. Patient states she is ok.   Will monitor patient/assess if RN can assist.

## 2017-06-15 NOTE — H&P
EDC:2017  EGA: 39 weeks, 0 days      Primary Provider:  Raisa Palafox. Marie Castaneda MD    CC:  scheduled CS. History of Present Illness:  33 y/o  @ 39/0wks presents for repeat CS. No ctx, LOF or VB. Good fetal movement. Pregnancy complicated by prior Low Transverse  Section (FTP) and SGA (37/6wks EFW 24% (6-7), HC 37/5, AC 35/5, anterior placenta, NGUYEN 15.2cm, vtx), sickle trait (FOB declined testing). Patient's Prenatal Care with Doctor of Record Summer Phillips MD Notable For -    SGA ____  Normal pregnancy multigravida  Prev LTCS, desires repeat   lab screening  Routine GYN no problems  Obesity (BMI > 29.99)  SICKLE CELL TRAIT-FOB declines testing          Impression & Recommendations:    Problem # 1:  Normal pregnancy multigravida (ICD-V22.1) (AZN02-I43.10)    Problem # 2:  Prev LTCS, desires repeat (AAD-545.51) (YAX10-U05.211)  Desires repeat. RLTCS today. R/B reviewed. Problem # 3:  SGA ____ (YAQ-170.19) (VTK48-G32.5930)    Problem # 4:  SICKLE CELL TRAIT-FOB declines testing (EUG-620.88) (ARS60-W31.2xx0)          Past Medical History:     Reviewed history from 2016 and no changes required:        tested positive for Sickle cell trait    Past Surgical History:     Reviewed history from 2013 and no changes required:        [id number] LTCS, Female Dr. Ron Childress     Family History Summary:      Reviewed history Last on 2017 and no changes required:06/15/2017      General Comments - FH:  Family history transferred to [address] [place] [address] And [facility]     Social History:     Reviewed history from 2016 and no changes required:        Omid Patel year relationship        works at [employer]        See HPI    Except as noted in the HPI, the review of systems is negative for General, Breast, , CV, Resp, GI, Endo, MS, Derm, Neuro, Psych, Eyes, ENT, Allergy and Heme.     Allergies    NAPROXEN  (NAPROXEN TBEC) (Moderate)      Medications Removed from Medication List        Flowsheet View for Follow-up Visit     Estimated weeks of        gestation:  39 0/7     Fetal position:  vertex     Preceptor:  catrachita     Comment:  Scheduled CS. Visit Vitals    /86    Pulse 96    Ht 5' 2\" (1.575 m)    Wt 79.8 kg (176 lb)    LMP 09/21/2016    SpO2 99%    Breastfeeding No    BMI 32.19 kg/m2             Physical Exam     General           General appearance:  no acute distress    Head           Inspection:   normal    Eyes           External:   EOM intact    ENT           Dental:   adequate dentition    Chest           Lungs:  clear to auscultation          Heart:  regular rate and rhythm    Extremeties           Extremeties:  0 edema    Psych           Orientation:  oriented to time, place, and person          Mood:  no appearance of anxiety, depression, or agitation    Lymph           Inguinal:  no inguinal adenopathy    Skin           Inspection:  no rashes, suspicious lesions, or ulcerations    Abdomen           Abdomen:  gravid    Pelvic Exam           EGBUS:  no lesions          Vagina:  normal appearing without lesions or discharge          Uterus:  gravid          Cervix:  no lesions or discharge                  Presentation:  vertex    [place]: No ctx  FHR: category 1        Impression & Recommendations:    Problem # 1:  Normal pregnancy multigravida (ICD-V22.1) (IMP63-X34.61)    Problem # 2:  Prev LTCS, desires repeat ([facility]-029.55) (XOW95-Y03.211)  Desires repeat. RLTCS today. R/B reviewed.      Problem # 3:  SGA ____ (QSC-413.32) (ZBD98-T20.5930)    Problem # 4:  SICKLE CELL TRAIT-FOB declines testing (EOD-944.88) (GPN99-O73.2xx0)      Medications (at conclusion of this visit)          LABORATORY DATA   TEST DATE RESULT   Group B Strep culture 05/26/2017 Negative                                   (Group B Strep Culture Result Field)   Blood Type 12/09/2016 O                                             (Blood Type Result Field)   Rh 12/09/2016 Positive (Rh Result Field)   Rhogam Inj Given 08/16/2013 *   Tdap Vaccine Given 03/29/2017 Vacc. [address]   Antibody Screen 03/29/2017 Negative   Rubella  [facility] Reference Ranges On or After 3/10/14                  <0.90              Non-immune      0.90 - 0.99     Equivocal      >0.99              Immune    [facility] Reference Ranges  Before 3/10/14           <5                 Non-immune             5 - 9               Equivocal            >9                 Immune  [facility] Reference Ranges       < Or = 0.90       Negative             0.91-1.09          Equivocal            > Or = 1.10       Positive   12/09/2016     6.99     TPA (T Pallidum Antibodies) 03/29/2017 Negative   Serology (RPR) 08/16/2013 *   HBsAg 12/09/2016 Negative   HIV 12/09/2016 Non Reactive   Hemoglobin 03/29/2017 11.4   Hematocrit 03/29/2017 34.6   Platelets 73/91/4198 208 X10E3/UL   TSH 08/16/2013 *   Urine Culture 03/01/2017 Negative   GC DNA Probe 11/17/2016 Negative   Chlamydia DNA 11/17/2016 Negative   PAP 10/19/2015 NIL   Flu Vaccine Given 11/17/2016 accepted   HGBA1C 08/16/2013 *   HGB Electro     T4, Free 08/16/2013 *   BG Fasting 08/16/2013 *   GTT 1H 50G 03/29/2017 123   GTT 1H 100G 08/16/2013 *   GTT 2H 100G 08/16/2013 *   GTT 3H 100G 08/16/2013 *   Glucose Plasma 08/16/2013 *   CF Accept or Decline 11/17/2016 declined   CF Screen Result 11/17/2016 Declined   Nuchal Trans 02/02/2017 3.25^3. 25 mm&millimeters   AFP Only     Tetra 01/05/2017 *Screen Negative*   AFP Serum 08/16/2013 *   CVS 11/17/2016 declined   AFP Amniotic 08/16/2013 *   Amnio Karyo 08/16/2013 *   FISH 08/16/2013 *   GC Culture 08/16/2013 *   Chlamydia Cult 08/16/2013 *   Ureaplasma     Mycoplasma     WBC 12/09/2016 8.7 X10E3/UL   RBC 12/09/2016 4.01 X10E6/UL   MCV 12/09/2016 92   MCH 12/09/2016 30.4   MCHC RBC 12/09/2016 33.2     ULTRASOUND DATA   TEST DATE RESULT   Estimated Fetal Weight 06/07/2017 3298.77044225^8752 g&grams Weight % 06/07/2017 24^24% %&percent                                                NGUYEN 06/07/2017 15.2^15.2 cm&centimeters                    BPP 06/07/2017 8^8 [n/a]&Not applicable   Cervical Length (mm)             Electronically signed by Darlene Olivia MD on 06/15/2017 at 8:21 AM    ________________________________________________________________________    Date of Surgery Update:  Jamie Wilson was seen and examined. History and physical has been reviewed. The patient has been examined.  There have been no significant clinical changes since the completion of the originally dated History and Physical.    Signed By: Darlene Olivia MD     Olivia 15, 2017 8:23 AM

## 2017-06-16 LAB
BASOPHILS # BLD AUTO: 0 K/UL (ref 0–0.1)
BASOPHILS # BLD: 0 % (ref 0–1)
EOSINOPHIL # BLD: 0 K/UL (ref 0–0.4)
EOSINOPHIL NFR BLD: 0 % (ref 0–7)
ERYTHROCYTE [DISTWIDTH] IN BLOOD BY AUTOMATED COUNT: 13.6 % (ref 11.5–14.5)
HCT VFR BLD AUTO: 28.4 % (ref 35–47)
HGB BLD-MCNC: 9.3 G/DL (ref 11.5–16)
LYMPHOCYTES # BLD AUTO: 19 % (ref 12–49)
LYMPHOCYTES # BLD: 1.3 K/UL (ref 0.8–3.5)
MCH RBC QN AUTO: 28.4 PG (ref 26–34)
MCHC RBC AUTO-ENTMCNC: 32.7 G/DL (ref 30–36.5)
MCV RBC AUTO: 86.6 FL (ref 80–99)
MONOCYTES # BLD: 0.4 K/UL (ref 0–1)
MONOCYTES NFR BLD AUTO: 6 % (ref 5–13)
NEUTS SEG # BLD: 5.1 K/UL (ref 1.8–8)
NEUTS SEG NFR BLD AUTO: 75 % (ref 32–75)
PLATELET # BLD AUTO: 121 K/UL (ref 150–400)
RBC # BLD AUTO: 3.28 M/UL (ref 3.8–5.2)
WBC # BLD AUTO: 6.9 K/UL (ref 3.6–11)

## 2017-06-16 PROCEDURE — 85025 COMPLETE CBC W/AUTO DIFF WBC: CPT | Performed by: OBSTETRICS & GYNECOLOGY

## 2017-06-16 PROCEDURE — 74011250637 HC RX REV CODE- 250/637: Performed by: OBSTETRICS & GYNECOLOGY

## 2017-06-16 PROCEDURE — 65410000002 HC RM PRIVATE OB

## 2017-06-16 PROCEDURE — 36415 COLL VENOUS BLD VENIPUNCTURE: CPT | Performed by: OBSTETRICS & GYNECOLOGY

## 2017-06-16 RX ADMIN — DOCUSATE SODIUM 100 MG: 100 CAPSULE, LIQUID FILLED ORAL at 19:50

## 2017-06-16 RX ADMIN — DOCUSATE SODIUM 100 MG: 100 CAPSULE, LIQUID FILLED ORAL at 09:00

## 2017-06-16 RX ADMIN — IBUPROFEN 800 MG: 400 TABLET ORAL at 16:30

## 2017-06-16 RX ADMIN — IBUPROFEN 800 MG: 400 TABLET ORAL at 01:53

## 2017-06-16 RX ADMIN — IBUPROFEN 800 MG: 400 TABLET ORAL at 09:00

## 2017-06-16 NOTE — PROGRESS NOTES
Bedside shift change report given to COLE (oncoming nurse) by ЮЛИЯ Ying (offgoing nurse). Report given with SBAR.

## 2017-06-16 NOTE — PROGRESS NOTES
Intrathecal/Epidural DuraMorphFollow-up Note    1 Day Post-Op sp Procedure(s):   SECTION. Visit Vitals    /74 (BP 1 Location: Right arm, BP Patient Position: Sitting)    Pulse 81    Temp 36.9 °C (98.4 °F)    Resp 16    Ht 5' 2\" (1.575 m)    Wt 79.8 kg (176 lb)    LMP 2016    SpO2 100%    Breastfeeding Unknown    BMI 32.19 kg/m2   . Pain is well controlled with DuraMorph. Pain management as per primary service.

## 2017-06-16 NOTE — PROGRESS NOTES
Post-Operative  Day 1    Radha Mclaughlinison     Assessment: Post-Op day 1, stable    Plan:   1. Routine post-operative care   2. Post-op anemia- asymptomatic, no evidence of active bleeding- home on iron   3. Circumcision as outpatient due to twisted raphe    Information for the patient's :  Hilary Xie [[id number]]   , Low Transverse   Patient doing well without significant complaint. Nausea and vomiting resolved, tolerating liquids, no flatus, larsen in place. Vitals:  Visit Vitals    /74 (BP 1 Location: Right arm, BP Patient Position: Sitting)    Pulse 81    Temp 98.4 °F (36.9 °C)    Resp 16    Ht 5' 2\" (1.575 m)    Wt 79.8 kg (176 lb)    LMP 2016    SpO2 100%    Breastfeeding Unknown    BMI 32.19 kg/m2     Temp (24hrs), Av.2 °F (36.8 °C), Min:97.6 °F (36.4 °C), Max:98.6 °F (37 °C)      Last 24hr Input/Output:    Intake/Output Summary (Last 24 hours) at 17 1011  Last data filed at 17 0749   Gross per 24 hour   Intake          1789.67 ml   Output             3250 ml   Net         -1460.33 ml          Exam:        Patient without distress. Lungs clear. Abdomen, bowel sounds present, soft, expected tenderness, fundus firm Wound dressing intact     Perineum normal lochia noted               Lower extremities are negative for swelling, cords or tenderness.     Labs:   Lab Results   Component Value Date/Time    WBC 6.9 2017 03:43 AM    WBC 7.2 06/15/2017 06:20 AM    WBC 8.7 2015 01:55 PM    WBC 14.9 2013 06:30 AM    WBC 11.5 2013 10:45 AM    WBC 5.3 2012 09:30 AM    HGB 9.3 2017 03:43 AM    HGB 11.6 06/15/2017 06:20 AM    HGB 13.9 2015 01:55 PM    HGB 9.3 2013 06:30 AM    HGB 12.0 2013 10:45 AM    HGB 12.6 2012 09:30 AM    HCT 28.4 2017 03:43 AM    HCT 34.3 06/15/2017 06:20 AM    HCT 40.2 2015 01:55 PM    HCT 27.6 2013 06:30 AM    HCT 34.8 2013 10:45 AM    HCT 36.4 12/14/2012 09:30 AM    PLATELET 414 92/65/5555 03:43 AM    PLATELET 907 56/44/2625 06:20 AM    PLATELET 138 35/85/5581 01:55 PM    PLATELET 86 60/50/0763 06:30 AM    PLATELET 225 66/56/0825 10:45 AM    PLATELET 751 88/34/9479 09:30 AM       Recent Results (from the past 24 hour(s))   CBC WITH AUTOMATED DIFF    Collection Time: 06/16/17  3:43 AM   Result Value Ref Range    WBC 6.9 3.6 - 11.0 K/uL    RBC 3.28 (L) 3.80 - 5.20 M/uL    HGB 9.3 (L) 11.5 - 16.0 g/dL    HCT 28.4 (L) 35.0 - 47.0 %    MCV 86.6 80.0 - 99.0 FL    MCH 28.4 26.0 - 34.0 PG    MCHC 32.7 30.0 - 36.5 g/dL    RDW 13.6 11.5 - 14.5 %    PLATELET 155 (L) 832 - 400 K/uL    NEUTROPHILS 75 32 - 75 %    LYMPHOCYTES 19 12 - 49 %    MONOCYTES 6 5 - 13 %    EOSINOPHILS 0 0 - 7 %    BASOPHILS 0 0 - 1 %    ABS. NEUTROPHILS 5.1 1.8 - 8.0 K/UL    ABS. LYMPHOCYTES 1.3 0.8 - 3.5 K/UL    ABS. MONOCYTES 0.4 0.0 - 1.0 K/UL    ABS. EOSINOPHILS 0.0 0.0 - 0.4 K/UL    ABS.  BASOPHILS 0.0 0.0 - 0.1 K/UL

## 2017-06-16 NOTE — ROUTINE PROCESS
Bedside shift change report given to Teja Vicente RN (oncoming nurse) by SURINDER Dickerson RN (offgoing nurse). Report included the following information SBAR, Kardex, Procedure Summary, Intake/Output, MAR and Recent Results.

## 2017-06-17 VITALS
HEIGHT: 62 IN | RESPIRATION RATE: 17 BRPM | BODY MASS INDEX: 32.39 KG/M2 | DIASTOLIC BLOOD PRESSURE: 80 MMHG | OXYGEN SATURATION: 100 % | TEMPERATURE: 98.6 F | WEIGHT: 176 LBS | HEART RATE: 81 BPM | SYSTOLIC BLOOD PRESSURE: 154 MMHG

## 2017-06-17 PROCEDURE — 74011250637 HC RX REV CODE- 250/637: Performed by: OBSTETRICS & GYNECOLOGY

## 2017-06-17 RX ORDER — OXYCODONE AND ACETAMINOPHEN 5; 325 MG/1; MG/1
1-2 TABLET ORAL
Qty: 30 TAB | Refills: 0 | Status: ON HOLD | OUTPATIENT
Start: 2017-06-17 | End: 2022-01-21 | Stop reason: CLARIF

## 2017-06-17 RX ORDER — IBUPROFEN 600 MG/1
600 TABLET ORAL
Qty: 30 TAB | Refills: 0 | Status: SHIPPED | OUTPATIENT
Start: 2017-06-17 | End: 2018-06-12

## 2017-06-17 RX ADMIN — DOCUSATE SODIUM 100 MG: 100 CAPSULE, LIQUID FILLED ORAL at 10:11

## 2017-06-17 RX ADMIN — IBUPROFEN 800 MG: 400 TABLET ORAL at 01:43

## 2017-06-17 RX ADMIN — IBUPROFEN 800 MG: 400 TABLET ORAL at 10:10

## 2017-06-17 NOTE — PROGRESS NOTES
Post-Operative  Day 2    Radha Miller       Assessment: Post-Op day 2, doing well and desiring discharge home    Plan:   1. Discharge home today  2. Follow up in office in 6 weeks with Loreen Bosworth, MD  3. Post partum activity/wound care advised, diet as tolerated  4. Discharge Medications: ibuprofen, percocet and medications prior to admission  5. Mildly elevated BP- patient is asymptomatic- will call with headaches, vision changes, CP/SOB, follow up next week in office for BP check    Information for the patient's :  Queta Speak [[id number]]   , Low Transverse   Patient doing well without significant complaint. Tolerating diet, passing flatus, voiding and ambulating without difficulty    Vitals:  Visit Vitals    /80 (BP 1 Location: Right arm, BP Patient Position: At rest)    Pulse 81    Temp 98.6 °F (37 °C)    Resp 17    Ht 5' 2\" (1.575 m)    Wt 79.8 kg (176 lb)    LMP 2016    SpO2 100%    Breastfeeding Unknown    BMI 32.19 kg/m2     Temp (24hrs), Av.4 °F (36.9 °C), Min:97.9 °F (36.6 °C), Max:98.6 °F (37 °C)        Exam:        Patient without distress. Abdomen, bowel sounds present, soft, expected tenderness, fundus firm                Wound incision clean, dry and intact               Lower extremities are negative for swelling, cords or tenderness.     Labs:   Lab Results   Component Value Date/Time    WBC 6.9 2017 03:43 AM    WBC 7.2 06/15/2017 06:20 AM    WBC 8.7 2015 01:55 PM    WBC 14.9 2013 06:30 AM    WBC 11.5 2013 10:45 AM    WBC 5.3 2012 09:30 AM    HGB 9.3 2017 03:43 AM    HGB 11.6 06/15/2017 06:20 AM    HGB 13.9 2015 01:55 PM    HGB 9.3 2013 06:30 AM    HGB 12.0 2013 10:45 AM    HGB 12.6 2012 09:30 AM    HCT 28.4 2017 03:43 AM    HCT 34.3 06/15/2017 06:20 AM    HCT 40.2 2015 01:55 PM    HCT 27.6 2013 06:30 AM    HCT 34.8 2013 10:45 AM    HCT 36.4 12/14/2012 09:30 AM    PLATELET 974 19/83/6390 03:43 AM    PLATELET 033 86/87/4781 06:20 AM    PLATELET 926 79/59/8752 01:55 PM    PLATELET 86 27/67/9996 06:30 AM    PLATELET 897 98/57/9375 10:45 AM    PLATELET 858 91/32/1165 09:30 AM       No results found for this or any previous visit (from the past 24 hour(s)).

## 2017-06-17 NOTE — DISCHARGE SUMMARY
Obstetrical Discharge Summary     Name: Franko Marcum MRN: [id number]  SSN: [id number]    YOB: 1990  Age: 32 y.o. Sex: female      Admit Date: 6/15/2017    Discharge Date: 2017     Admitting Physician: Prashanth Salinas MD     Attending Physician:  Prashanth Salinas MD     Admission Diagnoses: Repeat C/Section  Pregnancy    Discharge Diagnoses:   Information for the patient's :  Leslie Sloan [[id number]]   Delivery of a 2.945 kg male infant via , Low Transverse on 6/15/2017 at 8:37 AM  by . Apgars were 9 and . Additional Diagnoses:   Hospital Problems  Date Reviewed: 10/28/2016    None         Lab Results   Component Value Date/Time    Rubella, External immune 6.99 2016    GrBStrep, External neg 2017       Hospital Course: Normal hospital course following the delivery. Disposition at Discharge: Home or self care    Discharged Condition: Stable    Patient Instructions:   Current Discharge Medication List      START taking these medications    Details   ibuprofen (MOTRIN) 600 mg tablet Take 1 Tab by mouth every six (6) hours as needed for Pain for up to 360 days. Qty: 30 Tab, Refills: 0      oxyCODONE-acetaminophen (PERCOCET) 5-325 mg per tablet Take 1-2 Tabs by mouth every four (4) hours as needed for Pain. Max Daily Amount: 12 Tabs. Qty: 30 Tab, Refills: 0      ferrous sulfate (SLOW FE) 142 mg (45 mg iron) ER tablet Take 1 Tab by mouth Daily (before breakfast). Qty: 30 Tab, Refills: 1         CONTINUE these medications which have NOT CHANGED    Details   prenatal multivit-ca-min-fe-fa (PRENATAL VITAMIN) tab Take 1 Tab by mouth daily. Qty: 30 Tab, Refills: 9             Reference my discharge instructions.     Follow-up Appointments   Procedures    FOLLOW UP VISIT Appointment in: 6 Weeks With Dr. Liza Whitten     With Dr. Liza Whitten     Standing Status:   Standing     Number of Occurrences:   1     Order Specific Question:   Appointment in     Answer:   6 Weeks      Next week in office for BP check  Signed By:  Debra Toledo MD     June 17, 2017

## 2017-06-17 NOTE — DISCHARGE INSTRUCTIONS
Section: What to Expect at [address] [place] [address]    A  section, or , is surgery to deliver your baby through a cut, called an incision, that the doctor makes in your lower belly and uterus. You may have some pain in your lower belly and need pain medicine for 1 to 2 weeks. You can expect some vaginal bleeding for several weeks. You will probably need about 6 weeks to fully recover. It is important to take it easy while the incision is healing. Avoid heavy lifting, strenuous activities, or exercises that strain the belly muscles while you are recovering. Ask a family member or friend for help with housework, cooking, and shopping. This care sheet gives you a general idea about how long it will take for you to recover. But each person recovers at a different pace. Follow the steps below to get better as quickly as possible. How can you care for yourself at home? Activity  · Rest when you feel tired. Getting enough sleep will help you recover. · Try to walk each day. Start by walking a little more than you did the day before. Bit by bit, increase the amount you walk. Walking boosts blood flow and helps prevent pneumonia, constipation, and blood clots. · Avoid strenuous activities, such as bicycle riding, jogging, weightlifting, and aerobic exercise, for 6 weeks or until your doctor says it is okay. · Until your doctor says it is okay, do not lift anything heavier than your baby. · Do not do sit-ups or other exercises that strain the belly muscles for 6 weeks or until your doctor says it is okay. · Hold a pillow over your incision when you cough or take deep breaths. This will support your belly and decrease your pain. · You may shower as usual. Pat the incision dry when you are done. · You will have some vaginal bleeding. Wear sanitary pads. Do not douche or use tampons until your doctor says it is okay. · Ask your doctor when you can drive again.   · You will probably need to take at least 6 weeks off work. It depends on the type of work you do and how you feel. · Ask your doctor when it is okay for you to have sex. Diet  · You can eat your normal diet. If your stomach is upset, try bland, low-fat foods like plain rice, broiled chicken, toast, and yogurt. · Drink plenty of fluids (unless your doctor tells you not to). · You may notice that your bowel movements are not regular right after your surgery. This is common. Try to avoid constipation and straining with bowel movements. You may want to take a fiber supplement every day. If you have not had a bowel movement after a couple of days, ask your doctor about taking a mild laxative. · If you are breastfeeding, do not drink any alcohol. Medicines  · Your doctor will tell you if and when you can restart your medicines. He or she will also give you instructions about taking any new medicines. · If you take blood thinners, such as warfarin (Coumadin), clopidogrel (Plavix), or aspirin, be sure to talk to your doctor. He or she will tell you if and when to start taking those medicines again. Make sure that you understand exactly what your doctor wants you to do. · Take pain medicines exactly as directed. ¨ If the doctor gave you a prescription medicine for pain, take it as prescribed. ¨ If you are not taking a prescription pain medicine, ask your doctor if you can take an over-the-counter medicine. · If you think your pain medicine is making you sick to your stomach:  ¨ Take your medicine after meals (unless your doctor has told you not to). ¨ Ask your doctor for a different pain medicine. · If your doctor prescribed antibiotics, take them as directed. Do not stop taking them just because you feel better. You need to take the full course of antibiotics. Incision care  · If you have strips of tape on the incision, leave the tape on for a week or until it falls off. · Wash the area daily with warm, soapy water, and pat it dry. Don't use hydrogen peroxide or alcohol, which can slow healing. You may cover the area with a gauze bandage if it weeps or rubs against clothing. Change the bandage every day. · Keep the area clean and dry. Other instructions  · If you breastfeed your baby, you may be more comfortable while you are healing if you place the baby so that he or she is not resting on your belly. Try tucking your baby under your arm, with his or her body along the side you will be feeding on. Support your baby's upper body with your arm. With that hand you can control your baby's head to bring his or her mouth to your breast. This is sometimes called the football hold. Follow-up care is a key part of your treatment and safety. Be sure to make and go to all appointments, and call your doctor if you are having problems. It's also a good idea to know your test results and keep a list of the medicines you take. When should you call for help? Call 911 anytime you think you may need emergency care. For example, call if:  · You passed out (lost consciousness). · You have symptoms of a blood clot in your lung (called a pulmonary embolism). These may include:  ¨ Sudden chest pain. ¨ Trouble breathing. ¨ Coughing up blood. · You have thoughts of harming yourself, your baby, or another person. Call your doctor now or seek immediate medical care if:  · You have severe vaginal bleeding. This means that you are soaking through a pad every hour for 2 or more hours. · You are dizzy or lightheaded, or you feel like you may faint. · You have new or more belly pain. · You have loose stitches, or your incision comes open. · You have symptoms of infection, such as:  ¨ Increased pain, swelling, warmth, or redness. ¨ Red streaks leading from the incision. ¨ Pus draining from the incision. ¨ A fever.   · You have symptoms of a blood clot in your leg (called a deep vein thrombosis), such as:  ¨ Pain in your calf, back of the knee, thigh, or groin.  ¨ Redness and swelling in your leg or groin. Watch closely for changes in your health, and be sure to contact your doctor if:  · You feel sad, anxious, or hopeless for more than a few days. · You do not get better as expected. Where can you learn more? Go to http://sandra-halima.info/. Enter M806 in the search box to learn more about \" Section: What to Expect at Home. \"  Current as of: May 30, 2016  Content Version: 11.2  © 7499-8499 [employer]. Care instructions adapted under license by [employer] (which disclaims liability or warranty for this information). If you have questions about a medical condition or this instruction, always ask your healthcare professional. [address] any warranty or liability for your use of this information. POST DELIVERY DISCHARGE INSTRUCTIONS    Name: Emelina Kolb  YOB: 1990  Primary Diagnosis: Active Problems:    * No active hospital problems. *      General:     Diet/Diet Restrictions:  · Eight 8-ounce glasses of fluid daily (water, juices); avoid excessive caffeine intake. · Meals/snacks as desired which are high in fiber and carbohydrates and low in fat and cholesterol. Medications:         Physical Activity / Restrictions / Safety:     · Avoid heavy lifting, no more that 8 lbs. For 2-3 weeks;   · Limit use of stairs to 2 times daily for the first week home. · No driving for one week. · Avoid intercourse 4-6 weeks, no douching or tampon use. · Check with obstetrician before starting or resuming an exercise program.      Discharge Instructions/Special Treatment/Home Care Needs:     · Continue prenatal vitamins. · Continue to use squirt bottle with warm water on your episiotomy after each bathroom use until bleeding stops. · If steri-strips applied to your incision, remove in 7-10 days.     Call your doctor for the following:     · Fever over 101 degrees by mouth.  · Vaginal bleeding heavier than a normal menstrual period or clots larger than a golf ball. · Red streaks or increased swelling of legs, painful red streaks on your breast.  · Painful urination, constipation and increased pain or swelling or discharge with your incision. · If you feel extremely anxious or overwhelmed. · If you have thoughts of harming yourself and/or your baby. Pain Management:     · Take Acetaminophen (Tylenol) or Ibuprofen (Advil, Motrin), as directed for pain. · Use a warm Sitz bath 3 times daily to relieve episiotomy or hemorrhoidal discomfort. · For hemorrhoidal discomfort, use Tucks and Anusol cream as needed and directed. · Heating pad to  incision as needed.      Follow-Up Care:     Appointment with MD:   Follow-up Appointments   Procedures    FOLLOW UP VISIT Appointment in: 6 Weeks With Dr. Barron Began     With Dr. Barron Began     Standing Status:   Standing     Number of Occurrences:   1     Order Specific Question:   Appointment in     Answer:   Kai Guerrero     Telephone number: [phone]    Signed By: Tito Stratton MD                                                                                                   Date: 2017 Time: 8:19 AM

## 2017-06-17 NOTE — PROGRESS NOTES
Bedside and Verbal shift change report given to ELMA Vazquez RN  (oncoming nurse) by E. Sylvester Duverney  (offgoing nurse). Report included the following information SBAR, Kardex, OR Summary, Procedure Summary, Intake/Output, MAR and Recent Results.

## 2017-06-17 NOTE — PROGRESS NOTES
Discharge instructions and prescriptions given to pt. Understanding verbalized. 1230: Discharged via w/c. Infant carried in infant carrier by father of baby. Taken to vehicle by hospital volunteer.

## 2021-07-23 LAB
CHLAMYDIA, EXTERNAL: NORMAL
HBSAG, EXTERNAL: NON REACTIVE
HIV, EXTERNAL: NON REACTIVE
N. GONORRHEA, EXTERNAL: NORMAL
RUBELLA, EXTERNAL: NORMAL
T. PALLIDUM, EXTERNAL: NORMAL
TYPE, ABO & RH, EXTERNAL: NORMAL

## 2021-11-22 LAB — ANTIBODY SCREEN, EXTERNAL: NORMAL

## 2022-01-13 LAB — GRBS, EXTERNAL: NORMAL

## 2022-01-21 ENCOUNTER — HOSPITAL ENCOUNTER (INPATIENT)
Age: 32
LOS: 2 days | Discharge: HOME | DRG: 540 | End: 2022-01-23
Attending: OBSTETRICS & GYNECOLOGY | Admitting: OBSTETRICS & GYNECOLOGY
Payer: MEDICAID

## 2022-01-21 ENCOUNTER — ANESTHESIA EVENT (OUTPATIENT)
Dept: LABOR AND DELIVERY | Age: 32
DRG: 540 | End: 2022-01-21
Payer: MEDICAID

## 2022-01-21 ENCOUNTER — ANESTHESIA (OUTPATIENT)
Dept: LABOR AND DELIVERY | Age: 32
DRG: 540 | End: 2022-01-21
Payer: MEDICAID

## 2022-01-21 PROBLEM — O13.9 GESTATIONAL HYPERTENSION: Status: ACTIVE | Noted: 2022-01-21

## 2022-01-21 LAB
ABO + RH BLD: NORMAL
ALBUMIN SERPL-MCNC: 2.3 G/DL (ref 3.5–5)
ALBUMIN/GLOB SERPL: 0.5 {RATIO} (ref 1.1–2.2)
ALP SERPL-CCNC: 235 U/L (ref 45–117)
ALT SERPL-CCNC: 11 U/L (ref 12–78)
ALT SERPL-CCNC: 9 U/L (ref 12–78)
AMPHET UR QL SCN: NEGATIVE
ANION GAP SERPL CALC-SCNC: 10 MMOL/L (ref 5–15)
AST SERPL-CCNC: 14 U/L (ref 15–37)
AST SERPL-CCNC: 16 U/L (ref 15–37)
BARBITURATES UR QL SCN: NEGATIVE
BASOPHILS # BLD: 0 K/UL (ref 0–0.1)
BASOPHILS NFR BLD: 0 % (ref 0–1)
BENZODIAZ UR QL: NEGATIVE
BILIRUB SERPL-MCNC: 0.9 MG/DL (ref 0.2–1)
BLOOD GROUP ANTIBODIES SERPL: NORMAL
BUN SERPL-MCNC: 3 MG/DL (ref 6–20)
BUN/CREAT SERPL: 4 (ref 12–20)
CALCIUM SERPL-MCNC: 8.8 MG/DL (ref 8.5–10.1)
CANNABINOIDS UR QL SCN: NEGATIVE
CHLORIDE SERPL-SCNC: 109 MMOL/L (ref 97–108)
CO2 SERPL-SCNC: 21 MMOL/L (ref 21–32)
COCAINE UR QL SCN: NEGATIVE
COVID-19 RAPID TEST, COVR: NOT DETECTED
CREAT SERPL-MCNC: 0.73 MG/DL (ref 0.55–1.02)
CREAT UR-MCNC: 76.9 MG/DL
DIFFERENTIAL METHOD BLD: ABNORMAL
DRUG SCRN COMMENT,DRGCM: NORMAL
EOSINOPHIL # BLD: 0 K/UL (ref 0–0.4)
EOSINOPHIL NFR BLD: 0 % (ref 0–7)
ERYTHROCYTE [DISTWIDTH] IN BLOOD BY AUTOMATED COUNT: 16.2 % (ref 11.5–14.5)
ERYTHROCYTE [DISTWIDTH] IN BLOOD BY AUTOMATED COUNT: 16.3 % (ref 11.5–14.5)
GLOBULIN SER CALC-MCNC: 4.5 G/DL (ref 2–4)
GLUCOSE SERPL-MCNC: 81 MG/DL (ref 65–100)
HCT VFR BLD AUTO: 27.4 % (ref 35–47)
HCT VFR BLD AUTO: 31.4 % (ref 35–47)
HGB BLD-MCNC: 10.2 G/DL (ref 11.5–16)
HGB BLD-MCNC: 8.6 G/DL (ref 11.5–16)
IMM GRANULOCYTES # BLD AUTO: 0 K/UL (ref 0–0.04)
IMM GRANULOCYTES NFR BLD AUTO: 1 % (ref 0–0.5)
LYMPHOCYTES # BLD: 1.9 K/UL (ref 0.8–3.5)
LYMPHOCYTES NFR BLD: 22 % (ref 12–49)
MCH RBC QN AUTO: 26 PG (ref 26–34)
MCH RBC QN AUTO: 26.2 PG (ref 26–34)
MCHC RBC AUTO-ENTMCNC: 31.4 G/DL (ref 30–36.5)
MCHC RBC AUTO-ENTMCNC: 32.5 G/DL (ref 30–36.5)
MCV RBC AUTO: 80.5 FL (ref 80–99)
MCV RBC AUTO: 82.8 FL (ref 80–99)
METHADONE UR QL: NEGATIVE
MONOCYTES # BLD: 0.7 K/UL (ref 0–1)
MONOCYTES NFR BLD: 8 % (ref 5–13)
NEUTS SEG # BLD: 5.9 K/UL (ref 1.8–8)
NEUTS SEG NFR BLD: 68 % (ref 32–75)
NRBC # BLD: 0.02 K/UL (ref 0–0.01)
NRBC # BLD: 0.03 K/UL (ref 0–0.01)
NRBC BLD-RTO: 0.2 PER 100 WBC
NRBC BLD-RTO: 0.3 PER 100 WBC
OPIATES UR QL: NEGATIVE
PCP UR QL: NEGATIVE
PLATELET # BLD AUTO: 147 K/UL (ref 150–400)
PLATELET # BLD AUTO: 190 K/UL (ref 150–400)
POTASSIUM SERPL-SCNC: 3.4 MMOL/L (ref 3.5–5.1)
PROT SERPL-MCNC: 6.8 G/DL (ref 6.4–8.2)
PROT UR-MCNC: 23 MG/DL (ref 0–11.9)
PROT/CREAT UR-RTO: 0.3
RBC # BLD AUTO: 3.31 M/UL (ref 3.8–5.2)
RBC # BLD AUTO: 3.9 M/UL (ref 3.8–5.2)
SODIUM SERPL-SCNC: 140 MMOL/L (ref 136–145)
SOURCE, COVRS: NORMAL
SPECIMEN EXP DATE BLD: NORMAL
WBC # BLD AUTO: 11.1 K/UL (ref 3.6–11)
WBC # BLD AUTO: 8.6 K/UL (ref 3.6–11)

## 2022-01-21 PROCEDURE — 74011250636 HC RX REV CODE- 250/636: Performed by: OBSTETRICS & GYNECOLOGY

## 2022-01-21 PROCEDURE — 74011250636 HC RX REV CODE- 250/636

## 2022-01-21 PROCEDURE — 36415 COLL VENOUS BLD VENIPUNCTURE: CPT

## 2022-01-21 PROCEDURE — 75410000002 HC LABOR FEE PER 1 HR

## 2022-01-21 PROCEDURE — 84156 ASSAY OF PROTEIN URINE: CPT

## 2022-01-21 PROCEDURE — 77030007866 HC KT SPN ANES BBMI -B: Performed by: NURSE ANESTHETIST, CERTIFIED REGISTERED

## 2022-01-21 PROCEDURE — 76010000392 HC C SECN EA ADDL 0.5 HR: Performed by: OBSTETRICS & GYNECOLOGY

## 2022-01-21 PROCEDURE — 80307 DRUG TEST PRSMV CHEM ANLYZR: CPT

## 2022-01-21 PROCEDURE — 85027 COMPLETE CBC AUTOMATED: CPT

## 2022-01-21 PROCEDURE — 77010026065 HC OXYGEN MINIMUM MEDICAL AIR

## 2022-01-21 PROCEDURE — 86900 BLOOD TYPING SEROLOGIC ABO: CPT

## 2022-01-21 PROCEDURE — 87635 SARS-COV-2 COVID-19 AMP PRB: CPT

## 2022-01-21 PROCEDURE — 74011250636 HC RX REV CODE- 250/636: Performed by: NURSE ANESTHETIST, CERTIFIED REGISTERED

## 2022-01-21 PROCEDURE — 84450 TRANSFERASE (AST) (SGOT): CPT

## 2022-01-21 PROCEDURE — 76060000078 HC EPIDURAL ANESTHESIA: Performed by: OBSTETRICS & GYNECOLOGY

## 2022-01-21 PROCEDURE — 76060000033 HC ANESTHESIA 1 TO 1.5 HR: Performed by: OBSTETRICS & GYNECOLOGY

## 2022-01-21 PROCEDURE — 84460 ALANINE AMINO (ALT) (SGPT): CPT

## 2022-01-21 PROCEDURE — 76010000391 HC C SECN FIRST 1 HR: Performed by: OBSTETRICS & GYNECOLOGY

## 2022-01-21 PROCEDURE — 74011000250 HC RX REV CODE- 250: Performed by: OBSTETRICS & GYNECOLOGY

## 2022-01-21 PROCEDURE — 74011000258 HC RX REV CODE- 258: Performed by: OBSTETRICS & GYNECOLOGY

## 2022-01-21 PROCEDURE — 65410000002 HC RM PRIVATE OB

## 2022-01-21 PROCEDURE — 85025 COMPLETE CBC W/AUTO DIFF WBC: CPT

## 2022-01-21 PROCEDURE — 74011000250 HC RX REV CODE- 250: Performed by: NURSE ANESTHETIST, CERTIFIED REGISTERED

## 2022-01-21 PROCEDURE — 75410000003 HC RECOV DEL/VAG/CSECN EA 0.5 HR

## 2022-01-21 PROCEDURE — 80053 COMPREHEN METABOLIC PANEL: CPT

## 2022-01-21 RX ORDER — NALOXONE HYDROCHLORIDE 0.4 MG/ML
0.4 INJECTION, SOLUTION INTRAMUSCULAR; INTRAVENOUS; SUBCUTANEOUS AS NEEDED
Status: DISCONTINUED | OUTPATIENT
Start: 2022-01-21 | End: 2022-01-23 | Stop reason: HOSPADM

## 2022-01-21 RX ORDER — OXYCODONE AND ACETAMINOPHEN 5; 325 MG/1; MG/1
1 TABLET ORAL
Status: DISCONTINUED | OUTPATIENT
Start: 2022-01-21 | End: 2022-01-23 | Stop reason: HOSPADM

## 2022-01-21 RX ORDER — SODIUM CHLORIDE 0.9 % (FLUSH) 0.9 %
5-40 SYRINGE (ML) INJECTION AS NEEDED
Status: DISCONTINUED | OUTPATIENT
Start: 2022-01-21 | End: 2022-01-23 | Stop reason: HOSPADM

## 2022-01-21 RX ORDER — SODIUM CHLORIDE 0.9 % (FLUSH) 0.9 %
5-40 SYRINGE (ML) INJECTION AS NEEDED
Status: DISCONTINUED | OUTPATIENT
Start: 2022-01-21 | End: 2022-01-21 | Stop reason: HOSPADM

## 2022-01-21 RX ORDER — OXYTOCIN/RINGER'S LACTATE 30/500 ML
87.3 PLASTIC BAG, INJECTION (ML) INTRAVENOUS AS NEEDED
Status: COMPLETED | OUTPATIENT
Start: 2022-01-21 | End: 2022-01-21

## 2022-01-21 RX ORDER — ZOLPIDEM TARTRATE 5 MG/1
5 TABLET ORAL
Status: DISCONTINUED | OUTPATIENT
Start: 2022-01-21 | End: 2022-01-23 | Stop reason: HOSPADM

## 2022-01-21 RX ORDER — OXYTOCIN 10 [USP'U]/ML
INJECTION, SOLUTION INTRAMUSCULAR; INTRAVENOUS AS NEEDED
Status: DISCONTINUED | OUTPATIENT
Start: 2022-01-21 | End: 2022-01-21 | Stop reason: HOSPADM

## 2022-01-21 RX ORDER — OXYTOCIN/RINGER'S LACTATE 30/500 ML
87.3 PLASTIC BAG, INJECTION (ML) INTRAVENOUS AS NEEDED
Status: DISCONTINUED | OUTPATIENT
Start: 2022-01-21 | End: 2022-01-22

## 2022-01-21 RX ORDER — EPHEDRINE SULFATE/0.9% NACL/PF 50 MG/5 ML
SYRINGE (ML) INTRAVENOUS AS NEEDED
Status: DISCONTINUED | OUTPATIENT
Start: 2022-01-21 | End: 2022-01-21 | Stop reason: HOSPADM

## 2022-01-21 RX ORDER — LABETALOL HYDROCHLORIDE 5 MG/ML
40 INJECTION, SOLUTION INTRAVENOUS ONCE
Status: COMPLETED | OUTPATIENT
Start: 2022-01-21 | End: 2022-01-21

## 2022-01-21 RX ORDER — OXYTOCIN/RINGER'S LACTATE 30/500 ML
10 PLASTIC BAG, INJECTION (ML) INTRAVENOUS AS NEEDED
Status: DISCONTINUED | OUTPATIENT
Start: 2022-01-21 | End: 2022-01-22

## 2022-01-21 RX ORDER — SODIUM CHLORIDE, SODIUM LACTATE, POTASSIUM CHLORIDE, CALCIUM CHLORIDE 600; 310; 30; 20 MG/100ML; MG/100ML; MG/100ML; MG/100ML
INJECTION, SOLUTION INTRAVENOUS
Status: DISCONTINUED | OUTPATIENT
Start: 2022-01-21 | End: 2022-01-21 | Stop reason: HOSPADM

## 2022-01-21 RX ORDER — SODIUM CHLORIDE 0.9 % (FLUSH) 0.9 %
5-40 SYRINGE (ML) INJECTION EVERY 8 HOURS
Status: DISCONTINUED | OUTPATIENT
Start: 2022-01-21 | End: 2022-01-21 | Stop reason: HOSPADM

## 2022-01-21 RX ORDER — SODIUM CHLORIDE, SODIUM LACTATE, POTASSIUM CHLORIDE, CALCIUM CHLORIDE 600; 310; 30; 20 MG/100ML; MG/100ML; MG/100ML; MG/100ML
75 INJECTION, SOLUTION INTRAVENOUS CONTINUOUS
Status: DISCONTINUED | OUTPATIENT
Start: 2022-01-21 | End: 2022-01-23 | Stop reason: HOSPADM

## 2022-01-21 RX ORDER — FENTANYL CITRATE 50 UG/ML
INJECTION, SOLUTION INTRAMUSCULAR; INTRAVENOUS AS NEEDED
Status: DISCONTINUED | OUTPATIENT
Start: 2022-01-21 | End: 2022-01-21 | Stop reason: HOSPADM

## 2022-01-21 RX ORDER — SIMETHICONE 80 MG
80 TABLET,CHEWABLE ORAL AS NEEDED
Status: DISCONTINUED | OUTPATIENT
Start: 2022-01-21 | End: 2022-01-23 | Stop reason: HOSPADM

## 2022-01-21 RX ORDER — MAGNESIUM SULFATE HEPTAHYDRATE 40 MG/ML
4 INJECTION, SOLUTION INTRAVENOUS ONCE
Status: COMPLETED | OUTPATIENT
Start: 2022-01-21 | End: 2022-01-21

## 2022-01-21 RX ORDER — ONDANSETRON 2 MG/ML
INJECTION INTRAMUSCULAR; INTRAVENOUS
Status: COMPLETED
Start: 2022-01-21 | End: 2022-01-21

## 2022-01-21 RX ORDER — ONDANSETRON 2 MG/ML
INJECTION INTRAMUSCULAR; INTRAVENOUS AS NEEDED
Status: DISCONTINUED | OUTPATIENT
Start: 2022-01-21 | End: 2022-01-21 | Stop reason: HOSPADM

## 2022-01-21 RX ORDER — BUPIVACAINE HYDROCHLORIDE 7.5 MG/ML
INJECTION, SOLUTION EPIDURAL; RETROBULBAR AS NEEDED
Status: DISCONTINUED | OUTPATIENT
Start: 2022-01-21 | End: 2022-01-21 | Stop reason: HOSPADM

## 2022-01-21 RX ORDER — SODIUM CHLORIDE 0.9 % (FLUSH) 0.9 %
5-40 SYRINGE (ML) INJECTION EVERY 8 HOURS
Status: DISCONTINUED | OUTPATIENT
Start: 2022-01-21 | End: 2022-01-23 | Stop reason: HOSPADM

## 2022-01-21 RX ORDER — KETOROLAC TROMETHAMINE 30 MG/ML
30 INJECTION, SOLUTION INTRAMUSCULAR; INTRAVENOUS
Status: DISPENSED | OUTPATIENT
Start: 2022-01-21 | End: 2022-01-22

## 2022-01-21 RX ORDER — IBUPROFEN 400 MG/1
800 TABLET ORAL EVERY 8 HOURS
Status: DISCONTINUED | OUTPATIENT
Start: 2022-01-21 | End: 2022-01-23 | Stop reason: HOSPADM

## 2022-01-21 RX ORDER — SODIUM CHLORIDE, SODIUM LACTATE, POTASSIUM CHLORIDE, CALCIUM CHLORIDE 600; 310; 30; 20 MG/100ML; MG/100ML; MG/100ML; MG/100ML
1000 INJECTION, SOLUTION INTRAVENOUS CONTINUOUS
Status: DISCONTINUED | OUTPATIENT
Start: 2022-01-21 | End: 2022-01-21 | Stop reason: HOSPADM

## 2022-01-21 RX ORDER — OXYTOCIN/RINGER'S LACTATE 30/500 ML
10 PLASTIC BAG, INJECTION (ML) INTRAVENOUS AS NEEDED
Status: DISCONTINUED | OUTPATIENT
Start: 2022-01-21 | End: 2022-01-23 | Stop reason: HOSPADM

## 2022-01-21 RX ORDER — MORPHINE SULFATE 0.5 MG/ML
INJECTION, SOLUTION EPIDURAL; INTRATHECAL; INTRAVENOUS AS NEEDED
Status: DISCONTINUED | OUTPATIENT
Start: 2022-01-21 | End: 2022-01-21 | Stop reason: HOSPADM

## 2022-01-21 RX ORDER — LABETALOL HYDROCHLORIDE 5 MG/ML
20 INJECTION, SOLUTION INTRAVENOUS ONCE
Status: COMPLETED | OUTPATIENT
Start: 2022-01-21 | End: 2022-01-21

## 2022-01-21 RX ORDER — ONDANSETRON 2 MG/ML
4 INJECTION INTRAMUSCULAR; INTRAVENOUS
Status: DISCONTINUED | OUTPATIENT
Start: 2022-01-21 | End: 2022-01-23 | Stop reason: HOSPADM

## 2022-01-21 RX ADMIN — FENTANYL CITRATE 10 MCG: 50 INJECTION, SOLUTION INTRAMUSCULAR; INTRAVENOUS at 14:03

## 2022-01-21 RX ADMIN — Medication 87.3 MILLI-UNITS/MIN: at 15:05

## 2022-01-21 RX ADMIN — PHENYLEPHRINE HYDROCHLORIDE 80 MCG: 10 INJECTION INTRAVENOUS at 14:12

## 2022-01-21 RX ADMIN — Medication 200 MCG: at 14:03

## 2022-01-21 RX ADMIN — ONDANSETRON 4 MG: 2 INJECTION INTRAMUSCULAR; INTRAVENOUS at 19:08

## 2022-01-21 RX ADMIN — SODIUM CHLORIDE, POTASSIUM CHLORIDE, SODIUM LACTATE AND CALCIUM CHLORIDE 75 ML/HR: 600; 310; 30; 20 INJECTION, SOLUTION INTRAVENOUS at 19:15

## 2022-01-21 RX ADMIN — SODIUM CHLORIDE, SODIUM LACTATE, POTASSIUM CHLORIDE, CALCIUM CHLORIDE: 600; 310; 30; 20 INJECTION, SOLUTION INTRAVENOUS at 14:23

## 2022-01-21 RX ADMIN — LABETALOL HYDROCHLORIDE 20 MG: 5 INJECTION INTRAVENOUS at 17:32

## 2022-01-21 RX ADMIN — SODIUM CHLORIDE, POTASSIUM CHLORIDE, SODIUM LACTATE AND CALCIUM CHLORIDE: 600; 310; 30; 20 INJECTION, SOLUTION INTRAVENOUS at 13:50

## 2022-01-21 RX ADMIN — Medication 10 MG: at 14:13

## 2022-01-21 RX ADMIN — Medication 2 G/HR: at 20:07

## 2022-01-21 RX ADMIN — SODIUM CHLORIDE, POTASSIUM CHLORIDE, SODIUM LACTATE AND CALCIUM CHLORIDE 1000 ML: 600; 310; 30; 20 INJECTION, SOLUTION INTRAVENOUS at 12:27

## 2022-01-21 RX ADMIN — SODIUM CHLORIDE, POTASSIUM CHLORIDE, SODIUM LACTATE AND CALCIUM CHLORIDE 125 ML/HR: 600; 310; 30; 20 INJECTION, SOLUTION INTRAVENOUS at 15:05

## 2022-01-21 RX ADMIN — OXYTOCIN 20 UNITS: 10 INJECTION, SOLUTION INTRAMUSCULAR; INTRAVENOUS at 14:23

## 2022-01-21 RX ADMIN — ONDANSETRON HYDROCHLORIDE 4 MG: 2 INJECTION, SOLUTION INTRAMUSCULAR; INTRAVENOUS at 13:58

## 2022-01-21 RX ADMIN — KETOROLAC TROMETHAMINE 30 MG: 30 INJECTION, SOLUTION INTRAMUSCULAR; INTRAVENOUS at 19:06

## 2022-01-21 RX ADMIN — LABETALOL HYDROCHLORIDE 40 MG: 5 INJECTION INTRAVENOUS at 19:28

## 2022-01-21 RX ADMIN — SODIUM CHLORIDE, POTASSIUM CHLORIDE, SODIUM LACTATE AND CALCIUM CHLORIDE 1000 ML: 600; 310; 30; 20 INJECTION, SOLUTION INTRAVENOUS at 13:33

## 2022-01-21 RX ADMIN — MAGNESIUM SULFATE HEPTAHYDRATE 4 G: 40 INJECTION, SOLUTION INTRAVENOUS at 19:43

## 2022-01-21 RX ADMIN — BUPIVACAINE HYDROCHLORIDE 1.6 MG: 7.5 INJECTION, SOLUTION EPIDURAL; RETROBULBAR at 14:03

## 2022-01-21 RX ADMIN — WATER 2 G: 1 INJECTION INTRAMUSCULAR; INTRAVENOUS; SUBCUTANEOUS at 13:44

## 2022-01-21 RX ADMIN — PHENYLEPHRINE HYDROCHLORIDE 40 MCG/MIN: 10 INJECTION INTRAVENOUS at 14:10

## 2022-01-21 NOTE — H&P
History & Physical    Name: Paty Benitez MRN: [id number]  SSN: [id number]    YOB: 1990  Age: 32 y.o. Sex: female      Subjective:     Estimated Date of Delivery: 22  OB History    Para Term  AB Living   3 2 2     2   SAB IAB Ectopic Molar Multiple Live Births           0 2      # Outcome Date GA Lbr Tl/2nd Weight Sex Delivery Anes PTL Lv   3 Current            2 Term 06/15/17 39w0d  2.945 kg M CS-LTranv SPINAL AN N [place]   1 Term 13 39w5d  2.91 kg F  LO SPINAL AN, EPIDURAL AN N [place]       Ms. Gilma Momin admitted with pregnancy at 37w0d for  section due to Hypertension. She has a history of HTN but has not been on medication and BPs have been normal in pregnancy until the last week when BPs have been elevated in the mild range. She was seen yesterday by Dr Rommie Riedel and /90 and given she is 37 weeks decision was made to proceed with delivery and patient desires RCS. Please see prenatal records for details. 1. Obesity  BMI 39.3. ASA advised. OslJ7I=4.8, BL labs__nl___. Early glucola__nl_3 hr gtt_.___weekly surveillance at 37 wks- reassuring  2. Sickle cell trait  FOB___not tested   3. Abnormal glucose tolerance test- 3 hr gtt normal  4. Hypertensive disorder  no meds-will monitor.  BSL-normal, UPC=0.07    Past Medical History:   Diagnosis Date    Essential hypertension     Gestational hypertension     Sickle cell trait syndrome (HCC)     Sickle-cell disease, unspecified     trait     Past Surgical History:   Procedure Laterality Date    HX GYN      [place]  DELIVERY ONLY  2013     Social History     Occupational History    Not on file   Tobacco Use    Smoking status: Never Smoker    Smokeless tobacco: Never Used   Substance and Sexual Activity    Alcohol use: Yes     Comment: occ    Drug use: Yes     Types: Marijuana    Sexual activity: Yes     Partners: Male     Birth control/protection: None     Family History   Problem Relation Age of Onset    Hypertension Mother     Sickle Cell Trait Mother     Hypertension Father     Sickle Cell Trait Father     Diabetes Sister        Allergies   Allergen Reactions    Naproxen Swelling     Prior to Admission medications    Medication Sig Start Date End Date Taking? Authorizing Provider   oxyCODONE-acetaminophen (PERCOCET) 5-325 mg per tablet Take 1-2 Tabs by mouth every four (4) hours as needed for Pain. Max Daily Amount: 12 Tabs. 17   Siddhartha Montes MD   ferrous sulfate (SLOW FE) 142 mg (45 mg iron) ER tablet Take 1 Tab by mouth Daily (before breakfast). 17   Siddhartha Montes MD   prenatal multivit-ca-min-fe-fa (PRENATAL VITAMIN) tab Take 1 Tab by mouth daily. 10/20/16   Qamar Willard MD        Review of Systems: A comprehensive review of systems was negative except for that written in the History of Present Illness. Objective:     Vitals:  Vitals:    22 1027   BP: (!) 154/96   Pulse: (!) 114   Resp: 16   Temp: 98.7 °F (37.1 °C)   SpO2: 100%        Physical Exam:  Patient without distress. Abdomen: soft, nontender  Membranes:  Intact  Fetal Heart Rate: Reactive    Prenatal Labs:   Lab Results   Component Value Date/Time    ABO/Rh(D) O POSITIVE 06/15/2017 07:05 AM    Rubella, External Immune 2021 12:00 AM    GrBStrep, External neg 2022 12:00 AM    HBsAg, External non reactive 2021 12:00 AM    HIV, External non reactive 2021 12:00 AM    RPR, External nonreactive 2013 12:00 AM    Gonorrhea, External neg 2021 12:00 AM    Chlamydia, External neg 2021 12:00 AM    ABO,Rh O pos 2021 12:00 AM         Impression/Plan:     Plan:  Admit for  section. Group B Strep was negative. Discussed the risks of surgery including the risks of bleeding, infection, deep vein thrombosis, and surgical injuries to internal organs including but not limited to the bowels, bladder, rectum, and female reproductive organs.  The patient understands the risks; any and all questions were answered to the patient's satisfaction.  -cbc, cmp, STBB  -Ancef 3 gm IV pre-op  -SCDs and Mijares in OR  -monitor BPs and treat and severe range BPs

## 2022-01-21 NOTE — L&D DELIVERY NOTE
{ Delivery Summary  Patient: Adrian Almonte             Circumcision:   Desires \"Francisco\"  Additional Delivery Comments - Uncomplicated RCS at 40 weeks for [facility]    Information for the patient's :  Melva Mcwilliams [[id number]]     Delivery Type: , Low Transverse   Delivery Date: 2022   Delivery Time: 2:22 PM     Birth Weight: 3.24 kg     Sex:  male  Delivery Clinician:      Gestational Age: 41w0d    Presentation: Vertex   Position:             Apgars were 8  and 9      Resuscitation Method: Tactile Stimulation;Suctioning-bulb     Meconium Stained:      Living Status: Living       Placenta Date/Time: 2022  2:24 PM   Placenta Removal: Manual Removal   Placenta Appearance: Intact; Normal    Cord Information: 3 Vessels    Cord Events: None       Disposition of Cord Blood:      Blood Gases Sent?:          Cord pH:  none    Episiotomy:    Laceration(s):      Estimated Blood Loss (ml): No data found    Labor Events  Method:       Augmentation:    Cervical Ripening:             Operative Vaginal Delivery - none

## 2022-01-21 NOTE — ANESTHESIA POSTPROCEDURE EVALUATION
Procedure(s):   SECTION.     spinal    Anesthesia Post Evaluation      Multimodal analgesia: multimodal analgesia used between 6 hours prior to anesthesia start to PACU discharge  Patient location during evaluation: bedside  Patient participation: complete - patient participated  Level of consciousness: awake and alert  Pain management: adequate  Airway patency: patent  Anesthetic complications: no  Cardiovascular status: acceptable, hemodynamically stable and blood pressure returned to baseline  Respiratory status: acceptable and room air  Hydration status: euvolemic  Post anesthesia nausea and vomiting:  none  Final Post Anesthesia Temperature Assessment:  Normothermia (36.0-37.5 degrees C)      INITIAL Post-op Vital signs:   Vitals Value Taken Time   /74 22 1504   Temp 36.5 °C (97.7 °F) 22 1504   Pulse 85 22 1504   Resp 16 22 1504   SpO2 100 % 22 1504

## 2022-01-21 NOTE — ANESTHESIA PREPROCEDURE EVALUATION
Anesthetic History   No history of anesthetic complications            Review of Systems / Medical History  Patient summary reviewed, nursing notes reviewed and pertinent labs reviewed    Pulmonary              Pertinent negatives: No COPD and asthma     Neuro/Psych   Within defined limits        Pertinent negatives: No seizures and CVA   Cardiovascular    Hypertension (gestational HTN)              Exercise tolerance: >4 METS     GI/Hepatic/Renal  Within defined limits           Pertinent negatives: No renal disease   Endo/Other        Obesity  Pertinent negatives: No diabetes   Other Findings              Physical Exam    Airway  Mallampati: II  TM Distance: 4 - 6 cm  Neck ROM: normal range of motion   Mouth opening: Normal     Cardiovascular  Regular rate and rhythm,  S1 and S2 normal,  no murmur, click, rub, or gallop             Dental  No notable dental hx       Pulmonary  Breath sounds clear to auscultation               Abdominal  GI exam deferred       Other Findings            Anesthetic Plan    ASA: 2  Anesthesia type: spinal            Anesthetic plan and risks discussed with: Patient

## 2022-01-21 NOTE — ANESTHESIA PROCEDURE NOTES
Spinal Block    Start time: 1/21/2022 1:55 PM  End time: 1/21/2022 2:03 PM  Performed by: Patrick Santos CRNA  Authorized by: Lex Ward MD     Pre-procedure:   Indications: procedure for pain and primary anesthetic  Preanesthetic Checklist: patient identified, risks and benefits discussed, anesthesia consent, site marked, patient being monitored and timeout performed    Timeout Time: 13:55 EST          Spinal Block:   Patient Position:  Seated  Prep Region:  Lumbar  Prep: DuraPrep      Location:  L2-3  Technique:  Single shot        Needle:   Needle Type:  Pencan  Needle Gauge:  25 G  Attempts:  1      Events: CSF confirmed, no blood with aspiration and no paresthesia        Assessment:  Insertion:  Uncomplicated  Patient tolerance:  Patient tolerated the procedure well with no immediate complications

## 2022-01-22 LAB
BASOPHILS # BLD: 0 K/UL (ref 0–0.1)
BASOPHILS NFR BLD: 0 % (ref 0–1)
DIFFERENTIAL METHOD BLD: ABNORMAL
EOSINOPHIL # BLD: 0 K/UL (ref 0–0.4)
EOSINOPHIL NFR BLD: 0 % (ref 0–7)
ERYTHROCYTE [DISTWIDTH] IN BLOOD BY AUTOMATED COUNT: 16.5 % (ref 11.5–14.5)
HCT VFR BLD AUTO: 26.5 % (ref 35–47)
HGB BLD-MCNC: 8.6 G/DL (ref 11.5–16)
IMM GRANULOCYTES # BLD AUTO: 0.1 K/UL (ref 0–0.04)
IMM GRANULOCYTES NFR BLD AUTO: 0 % (ref 0–0.5)
LYMPHOCYTES # BLD: 1.5 K/UL (ref 0.8–3.5)
LYMPHOCYTES NFR BLD: 12 % (ref 12–49)
MCH RBC QN AUTO: 26.5 PG (ref 26–34)
MCHC RBC AUTO-ENTMCNC: 32.5 G/DL (ref 30–36.5)
MCV RBC AUTO: 81.5 FL (ref 80–99)
MONOCYTES # BLD: 0.7 K/UL (ref 0–1)
MONOCYTES NFR BLD: 6 % (ref 5–13)
NEUTS SEG # BLD: 9.6 K/UL (ref 1.8–8)
NEUTS SEG NFR BLD: 81 % (ref 32–75)
NRBC # BLD: 0 K/UL (ref 0–0.01)
NRBC BLD-RTO: 0 PER 100 WBC
PLATELET # BLD AUTO: 145 K/UL (ref 150–400)
PMV BLD AUTO: 12.9 FL (ref 8.9–12.9)
RBC # BLD AUTO: 3.25 M/UL (ref 3.8–5.2)
THERAPEUTIC MAGNESI,THMG: 5.4 MG/DL (ref 4.8–8.4)
WBC # BLD AUTO: 11.8 K/UL (ref 3.6–11)

## 2022-01-22 PROCEDURE — 74011250636 HC RX REV CODE- 250/636: Performed by: OBSTETRICS & GYNECOLOGY

## 2022-01-22 PROCEDURE — 74011250637 HC RX REV CODE- 250/637: Performed by: OBSTETRICS & GYNECOLOGY

## 2022-01-22 PROCEDURE — 83735 ASSAY OF MAGNESIUM: CPT

## 2022-01-22 PROCEDURE — 65410000002 HC RM PRIVATE OB

## 2022-01-22 PROCEDURE — 74011000258 HC RX REV CODE- 258: Performed by: OBSTETRICS & GYNECOLOGY

## 2022-01-22 PROCEDURE — 85025 COMPLETE CBC W/AUTO DIFF WBC: CPT

## 2022-01-22 PROCEDURE — 36415 COLL VENOUS BLD VENIPUNCTURE: CPT

## 2022-01-22 RX ORDER — LABETALOL 200 MG/1
200 TABLET, FILM COATED ORAL 2 TIMES DAILY
Status: DISCONTINUED | OUTPATIENT
Start: 2022-01-23 | End: 2022-01-23 | Stop reason: HOSPADM

## 2022-01-22 RX ORDER — LABETALOL 200 MG/1
200 TABLET, FILM COATED ORAL ONCE
Status: COMPLETED | OUTPATIENT
Start: 2022-01-22 | End: 2022-01-22

## 2022-01-22 RX ADMIN — OXYCODONE AND ACETAMINOPHEN 1 TABLET: 5; 325 TABLET ORAL at 04:04

## 2022-01-22 RX ADMIN — Medication 2 G/HR: at 01:15

## 2022-01-22 RX ADMIN — IBUPROFEN 800 MG: 400 TABLET, FILM COATED ORAL at 13:42

## 2022-01-22 RX ADMIN — OXYCODONE AND ACETAMINOPHEN 1 TABLET: 5; 325 TABLET ORAL at 09:15

## 2022-01-22 RX ADMIN — SODIUM CHLORIDE, POTASSIUM CHLORIDE, SODIUM LACTATE AND CALCIUM CHLORIDE 75 ML/HR: 600; 310; 30; 20 INJECTION, SOLUTION INTRAVENOUS at 15:42

## 2022-01-22 RX ADMIN — IBUPROFEN 800 MG: 400 TABLET, FILM COATED ORAL at 22:25

## 2022-01-22 RX ADMIN — SODIUM CHLORIDE, POTASSIUM CHLORIDE, SODIUM LACTATE AND CALCIUM CHLORIDE 75 ML/HR: 600; 310; 30; 20 INJECTION, SOLUTION INTRAVENOUS at 02:26

## 2022-01-22 RX ADMIN — Medication 2 G/HR: at 06:18

## 2022-01-22 RX ADMIN — Medication 2 G/HR: at 15:42

## 2022-01-22 RX ADMIN — Medication 2 G/HR: at 11:21

## 2022-01-22 RX ADMIN — IBUPROFEN 800 MG: 400 TABLET, FILM COATED ORAL at 06:21

## 2022-01-22 RX ADMIN — LABETALOL HYDROCHLORIDE 200 MG: 200 TABLET, FILM COATED ORAL at 18:47

## 2022-01-22 NOTE — PROGRESS NOTES
Post-Operative  Day 1    Radha Miller     Assessment: Post-Op day 1, stable RCS for GHTN with severe range BPs last night and now diagnose of pre-eclampsia with severe features on Magnesium for seizure prophylaxis    Plan:     - Routine post-operative care. - The risks and benefits of the circumcision  procedure and anesthesia including: bleeding, infection, variability of cosmetic results were discussed at length with the mother. She is aware that future repeat procedures may be necessary. She gives informed consent to proceed as noted and her questions are answered. - Postop hemoglobin stable at 8.6 (10.3)- asymptomatic, no signs of active bleeding. Plan to start Fe at discharge if pt anemic.  - Ambulate today. - Pre-eclampsia with severe features with normal to mild BPs on Magnesium for seizure prophylaxis- no signs or sxs of toxicity, normal LFTs, [id number]=[id number]- recheck tomorrow        Information for the patient's :  Gilberto Bravo [[id number]]   , Low Transverse     Patient doing well without significant complaint. Tolerating diet. Mijares out. Ambulating. Vitals:  Visit Vitals  BP (!) 147/82   Pulse 100   Temp 98.4 °F (36.9 °C)   Resp 16   Ht 5' 2\" (1.575 m)   Wt 87.1 kg (192 lb)   SpO2 99%   Breastfeeding Unknown   BMI 35.12 kg/m²     Temp (24hrs), Av °F (36.7 °C), Min:97 °F (36.1 °C), Max:98.7 °F (37.1 °C)      Last 24hr Input/Output:    Intake/Output Summary (Last 24 hours) at 2022 1000  Last data filed at 2022 0915  Gross per 24 hour   Intake 4804.76 ml   Output 2028 ml   Net 2776.76 ml          Exam:     Patient without distress. Fundus firm, nontender per nursing fundal checks. Incision bandaged, clean, dry, intact. Perineum with normal lochia noted per nursing assessment. Lower extremities are negative for pathological edema.     Labs:   Lab Results   Component Value Date/Time    WBC 11.8 (H) 2022 04:10 AM    WBC 11.1 (H) 01/21/2022 08:28 PM    WBC 8.6 01/21/2022 10:39 AM    WBC 6.9 06/16/2017 03:43 AM    WBC 7.2 06/15/2017 06:20 AM    WBC 8.7 11/02/2015 01:55 PM    WBC 14.9 (H) 08/17/2013 06:30 AM    WBC 11.5 (H) 08/16/2013 10:45 AM    WBC 5.3 12/14/2012 09:30 AM    HGB 8.6 (L) 01/22/2022 04:10 AM    HGB 8.6 (L) 01/21/2022 08:28 PM    HGB 10.2 (L) 01/21/2022 10:39 AM    HGB 9.3 (L) 06/16/2017 03:43 AM    HGB 11.6 06/15/2017 06:20 AM    HGB 13.9 11/02/2015 01:55 PM    HGB 9.3 (L) 08/17/2013 06:30 AM    HGB 12.0 08/16/2013 10:45 AM    HGB 12.6 12/14/2012 09:30 AM    HCT 26.5 (L) 01/22/2022 04:10 AM    HCT 27.4 (L) 01/21/2022 08:28 PM    HCT 31.4 (L) 01/21/2022 10:39 AM    HCT 28.4 (L) 06/16/2017 03:43 AM    HCT 34.3 (L) 06/15/2017 06:20 AM    HCT 40.2 11/02/2015 01:55 PM    HCT 27.6 (L) 08/17/2013 06:30 AM    HCT 34.8 (L) 08/16/2013 10:45 AM    HCT 36.4 12/14/2012 09:30 AM    PLATELET 788 (L) 30/32/6987 04:10 AM    PLATELET 394 (L) 28/83/9272 08:28 PM    PLATELET 298 84/89/8079 10:39 AM    PLATELET 549 (L) 49/78/6235 03:43 AM    PLATELET 830 45/98/5279 06:20 AM    PLATELET 732 73/18/0085 01:55 PM    PLATELET 86 (L) 21/92/4569 06:30 AM    PLATELET 974 (L) 26/93/1251 10:45 AM    PLATELET 302 08/81/9949 09:30 AM       Recent Results (from the past 24 hour(s))   CBC WITH AUTOMATED DIFF    Collection Time: 01/21/22 10:39 AM   Result Value Ref Range    WBC 8.6 3.6 - 11.0 K/uL    RBC 3.90 3.80 - 5.20 M/uL    HGB 10.2 (L) 11.5 - 16.0 g/dL    HCT 31.4 (L) 35.0 - 47.0 %    MCV 80.5 80.0 - 99.0 FL    MCH 26.2 26.0 - 34.0 PG    MCHC 32.5 30.0 - 36.5 g/dL    RDW 16.3 (H) 11.5 - 14.5 %    PLATELET 719 575 - 440 K/uL    NRBC 0.3 (H) 0  WBC    ABSOLUTE NRBC 0.03 (H) 0.00 - 0.01 K/uL    NEUTROPHILS 68 32 - 75 %    LYMPHOCYTES 22 12 - 49 %    MONOCYTES 8 5 - 13 %    EOSINOPHILS 0 0 - 7 %    BASOPHILS 0 0 - 1 %    IMMATURE GRANULOCYTES 1 (H) 0.0 - 0.5 %    ABS. NEUTROPHILS 5.9 1.8 - 8.0 K/UL    ABS.  LYMPHOCYTES 1.9 0.8 - 3.5 K/UL    ABS. MONOCYTES 0.7 0.0 - 1.0 K/UL    ABS. EOSINOPHILS 0.0 0.0 - 0.4 K/UL    ABS. BASOPHILS 0.0 0.0 - 0.1 K/UL    ABS. IMM. GRANS. 0.0 0.00 - 0.04 K/UL    DF AUTOMATED     TYPE & SCREEN    Collection Time: 01/21/22 10:39 AM   Result Value Ref Range    Crossmatch Expiration 01/24/2022,2359     ABO/Rh(D) O POSITIVE     Antibody screen NEG    DRUG SCREEN, URINE    Collection Time: 01/21/22 10:39 AM   Result Value Ref Range    AMPHETAMINES Negative NEG      BARBITURATES Negative NEG      BENZODIAZEPINES Negative NEG      COCAINE Negative NEG      METHADONE Negative NEG      OPIATES Negative NEG      PCP(PHENCYCLIDINE) Negative NEG      THC (TH-CANNABINOL) Negative NEG      Drug screen comment (NOTE)    COVID-19 RAPID TEST    Collection Time: 01/21/22 10:39 AM   Result Value Ref Range    Specimen source Nasopharyngeal      COVID-19 rapid test Not detected NOTD     METABOLIC PANEL, COMPREHENSIVE    Collection Time: 01/21/22 10:39 AM   Result Value Ref Range    Sodium 140 136 - 145 mmol/L    Potassium 3.4 (L) 3.5 - 5.1 mmol/L    Chloride 109 (H) 97 - 108 mmol/L    CO2 21 21 - 32 mmol/L    Anion gap 10 5 - 15 mmol/L    Glucose 81 65 - 100 mg/dL    BUN 3 (L) 6 - 20 MG/DL    Creatinine 0.73 0.55 - 1.02 MG/DL    BUN/Creatinine ratio 4 (L) 12 - 20      GFR est AA >60 >60 ml/min/1.73m2    GFR est non-AA >60 >60 ml/min/1.73m2    Calcium 8.8 8.5 - 10.1 MG/DL    Bilirubin, total 0.9 0.2 - 1.0 MG/DL    ALT (SGPT) 11 (L) 12 - 78 U/L    AST (SGOT) 14 (L) 15 - 37 U/L    Alk.  phosphatase 235 (H) 45 - 117 U/L    Protein, total 6.8 6.4 - 8.2 g/dL    Albumin 2.3 (L) 3.5 - 5.0 g/dL    Globulin 4.5 (H) 2.0 - 4.0 g/dL    A-G Ratio 0.5 (L) 1.1 - 2.2     CBC W/O DIFF    Collection Time: 01/21/22  8:28 PM   Result Value Ref Range    WBC 11.1 (H) 3.6 - 11.0 K/uL    RBC 3.31 (L) 3.80 - 5.20 M/uL    HGB 8.6 (L) 11.5 - 16.0 g/dL    HCT 27.4 (L) 35.0 - 47.0 %    MCV 82.8 80.0 - 99.0 FL    MCH 26.0 26.0 - 34.0 PG    MCHC 31.4 30.0 - 36.5 g/dL RDW 16.2 (H) 11.5 - 14.5 %    PLATELET 020 (L) 235 - 400 K/uL    NRBC 0.2 (H) 0  WBC    ABSOLUTE NRBC 0.02 (H) 0.00 - 0.01 K/uL   ALT    Collection Time: 01/21/22  8:28 PM   Result Value Ref Range    ALT (SGPT) 9 (L) 12 - 78 U/L   AST    Collection Time: 01/21/22  8:28 PM   Result Value Ref Range    AST (SGOT) 16 15 - 37 U/L   PROTEIN/CREATININE RATIO, URINE    Collection Time: 01/21/22  8:28 PM   Result Value Ref Range    Protein, urine random 23 (H) 0.0 - 11.9 mg/dL    Creatinine, urine 76.90 mg/dL    Protein/Creat. urine Ratio 0.3     CBC WITH AUTOMATED DIFF    Collection Time: 01/22/22  4:10 AM   Result Value Ref Range    WBC 11.8 (H) 3.6 - 11.0 K/uL    RBC 3.25 (L) 3.80 - 5.20 M/uL    HGB 8.6 (L) 11.5 - 16.0 g/dL    HCT 26.5 (L) 35.0 - 47.0 %    MCV 81.5 80.0 - 99.0 FL    MCH 26.5 26.0 - 34.0 PG    MCHC 32.5 30.0 - 36.5 g/dL    RDW 16.5 (H) 11.5 - 14.5 %    PLATELET 236 (L) 996 - 400 K/uL    MPV 12.9 8.9 - 12.9 FL    NRBC 0.0 0  WBC    ABSOLUTE NRBC 0.00 0.00 - 0.01 K/uL    NEUTROPHILS 81 (H) 32 - 75 %    LYMPHOCYTES 12 12 - 49 %    MONOCYTES 6 5 - 13 %    EOSINOPHILS 0 0 - 7 %    BASOPHILS 0 0 - 1 %    IMMATURE GRANULOCYTES 0 0.0 - 0.5 %    ABS. NEUTROPHILS 9.6 (H) 1.8 - 8.0 K/UL    ABS. LYMPHOCYTES 1.5 0.8 - 3.5 K/UL    ABS. MONOCYTES 0.7 0.0 - 1.0 K/UL    ABS. EOSINOPHILS 0.0 0.0 - 0.4 K/UL    ABS. BASOPHILS 0.0 0.0 - 0.1 K/UL    ABS. IMM.  GRANS. 0.1 (H) 0.00 - 0.04 K/UL    DF AUTOMATED     MAGNESIUM, THERAPEUTIC    Collection Time: 01/22/22  4:10 AM   Result Value Ref Range    Therapeutic magnesium 5.4 4.8 - 8.4 MG/DL

## 2022-01-22 NOTE — PROGRESS NOTES
Intrathecal/Epidural DuraMorphFollow-up Note    1 Day Post-Op sp Procedure(s):   SECTION. Visit Vitals  BP (!) 156/95   Pulse 95   Temp 37.1 °C (98.7 °F)   Resp 16   Ht 5' 2\" (1.575 m)   Wt 87.1 kg (192 lb)   SpO2 99%   Breastfeeding Unknown   BMI 35.12 kg/m²   . Pain is well controlled with DuraMorph. Pain management as per primary service.

## 2022-01-22 NOTE — PROGRESS NOTES
2310:  Report from Carlos Hopkins 79 [facility]. Assuming care of pt.      0410:  AM labs drawn from R [facility], pt talisha well. Sent to lab.    0720:  SBAR report to SURINDER Garcia [facility]. Care turned over.

## 2022-01-22 NOTE — PROGRESS NOTES
01/22/22 1115 01/22/22 1335 01/22/22 1350   Maternal Vital Signs   Pulse (Heart Rate) 100  --  100   BP (!) 154/84  (Dr. Adair Barry advised) (!) 157/77  (Dr. Bobbi Leo advised) (!) 159/97  (Dr. Bobbi Leo advised)   MAP (Monitor)  --  108 119   MAP (Calculated) 107 104 118   Pt asymptomatic, No new orders received. Will continue to monitor.

## 2022-01-22 NOTE — PROGRESS NOTES
Called by nursing for severe range BP after 20 mg IV Labetalol 2hrs ago for prior severe range BP. Patient denies headache,visual changes , or RUQ/epigastric pain. O) /110, P 75, R16, 100% sat       Lungs: CTA       CV: RR S1 S2 w/o abnormalities       Abd: Soft, NT,NG,NR w/ exception of a mild tenderness of               a FF at U-2       Ext: NT calves, - c/c/e       Neuro: DTRs 2 +/= , neg clonus    A) Gestational HTN with severe features based on BP    Plan: Labetalol 40 mg IV now. Magnesium Sulfate 4 gram bolus followed by 2 grams per hour. On admission pt had plts of 190 K. Labs now and at 0400 hrs.

## 2022-01-22 NOTE — PROGRESS NOTES
Bedside and Verbal shift change report given to SURINDER Heath RN (oncoming nurse) by ELMA Arriaga RN (offgoing nurse).  Report included the following information SBAR, Kardex, Intake/Output, MAR and Recent Results. '

## 2022-01-22 NOTE — PROGRESS NOTES
01/22/22 1828 01/22/22 1837 01/22/22 1847   Maternal Vital Signs   Pulse (Heart Rate) 89 95 92   O2 Sat (%)  --  100 % 100 %   Pulse via Oximetry 89 beats per minute 95 beats per minute 95 beats per minute   BP (!) 166/82 (!) 164/86 (!) 164/86   MAP (Monitor) 116 118 116   MAP (Calculated) 110 112 112   Dr. Joshua Rodrigues advised of severe pressures. Orders received to start Labetalol 200 mg PO BID. Order to give first dose now. Advised pt is asymptomatic.

## 2022-01-23 VITALS
DIASTOLIC BLOOD PRESSURE: 77 MMHG | HEIGHT: 62 IN | WEIGHT: 192 LBS | SYSTOLIC BLOOD PRESSURE: 159 MMHG | HEART RATE: 93 BPM | RESPIRATION RATE: 16 BRPM | OXYGEN SATURATION: 98 % | TEMPERATURE: 98.1 F | BODY MASS INDEX: 35.33 KG/M2

## 2022-01-23 LAB
ERYTHROCYTE [DISTWIDTH] IN BLOOD BY AUTOMATED COUNT: 16.9 % (ref 11.5–14.5)
HCT VFR BLD AUTO: 25 % (ref 35–47)
HGB BLD-MCNC: 8.2 G/DL (ref 11.5–16)
MCH RBC QN AUTO: 26.6 PG (ref 26–34)
MCHC RBC AUTO-ENTMCNC: 32.8 G/DL (ref 30–36.5)
MCV RBC AUTO: 81.2 FL (ref 80–99)
NRBC # BLD: 0.02 K/UL (ref 0–0.01)
NRBC BLD-RTO: 0.1 PER 100 WBC
PLATELET # BLD AUTO: 183 K/UL (ref 150–400)
PMV BLD AUTO: 12.2 FL (ref 8.9–12.9)
RBC # BLD AUTO: 3.08 M/UL (ref 3.8–5.2)
WBC # BLD AUTO: 14.2 K/UL (ref 3.6–11)

## 2022-01-23 PROCEDURE — 74011250637 HC RX REV CODE- 250/637: Performed by: OBSTETRICS & GYNECOLOGY

## 2022-01-23 PROCEDURE — 85027 COMPLETE CBC AUTOMATED: CPT

## 2022-01-23 PROCEDURE — 36415 COLL VENOUS BLD VENIPUNCTURE: CPT

## 2022-01-23 RX ADMIN — LABETALOL HYDROCHLORIDE 200 MG: 200 TABLET, FILM COATED ORAL at 08:18

## 2022-01-23 RX ADMIN — IBUPROFEN 800 MG: 400 TABLET, FILM COATED ORAL at 08:18

## 2022-01-23 NOTE — DISCHARGE INSTRUCTIONS
POST DELIVERY DISCHARGE INSTRUCTIONS    Name: Galdino Payment  YOB: 1990  Primary Diagnosis: Active Problems:    Gestational hypertension (2022)        General:     Diet/Diet Restrictions:  · Eight 8-ounce glasses of fluid daily (water, juices); avoid excessive caffeine intake. · Meals/snacks as desired which are high in fiber and carbohydrates and low in fat and cholesterol. Medications:         Physical Activity / Restrictions / Safety:     · Avoid heavy lifting, no more that 8 lbs. For 2-3 weeks;   · Limit use of stairs to 2 times daily for the first week home. · No driving for one week. · Avoid intercourse 4-6 weeks, no douching or tampon use. · Check with obstetrician before starting or resuming an exercise program.      Discharge Instructions/Special Treatment/Home Care Needs:     · Continue prenatal vitamins. · Continue to use squirt bottle with warm water on your episiotomy after each bathroom use until bleeding stops. · If steri-strips applied to your incision, remove in 7-10 days. Call your doctor for the following:     · Fever over 101 degrees by mouth. · Vaginal bleeding heavier than a normal menstrual period or clots larger than a golf ball. · Red streaks or increased swelling of legs, painful red streaks on your breast.  · Painful urination, constipation and increased pain or swelling or discharge with your incision. · If you feel extremely anxious or overwhelmed. · If you have thoughts of harming yourself and/or your baby. Pain Management:     · Take Acetaminophen (Tylenol) or Ibuprofen (Advil, Motrin), as directed for pain. · Use a warm Sitz bath 3 times daily to relieve episiotomy or hemorrhoidal discomfort. · For hemorrhoidal discomfort, use Tucks and Anusol cream as needed and directed. · Heating pad to  incision as needed.      Follow-Up Care:     Appointment with MD:   Follow-up Appointments   Procedures    FOLLOW UP VISIT Appointment in: Other (Specify) With Dr. Powell Krabbe in 2-3 days for blood pressure check and 6 weeks for postpartum check     With Dr. Powell Krabbe in 2-3 days for blood pressure check and 6 weeks for postpartum check     Standing Status:   Standing     Number of Occurrences:   1     Order Specific Question:   Appointment in     Answer:    Other (Specify)     Telephone number: [phone]    Signed By: Tennille Hamilton MD                                                                                                   Date: 1/23/2022 Time: 10:46 AM

## 2022-01-23 NOTE — DISCHARGE SUMMARY
Obstetrical Discharge Summary     Name: Johanna Batista MRN: [id number]  SSN: [id number]    YOB: 1990  Age: 32 y.o. Sex: female      Admit Date: 2022    Discharge Date: 2022     Admitting Physician: Deng Landis MD     Attending Physician:  Miguel Hsu MD     Admission Diagnoses: Gestational hypertension [O13.9]    Discharge Diagnoses:   Information for the patient's :  Minus Headings [[id number]]   Delivery of a 3.24 kg male infant via , Low Transverse on 2022 at 2:22 PM  by Deng Landis. Apgars were 8  and 9 . Additional Diagnoses:   Hospital Problems  Date Reviewed: 2022          Codes Class Noted POA    Gestational hypertension ICD-10-CM: O13.9  ICD-9-CM: 642.30  2022 Unknown             Lab Results   Component Value Date/Time    Rubella, External Immune 2021 12:00 AM    GrBStrep, External neg 2022 12:00 AM       Hospital Course: Postpartum course was complicated by severe hypertension and patient received Magenisum x 24 hours and was started on Labetalol 200  Mg BID, which added 0 days to the patient's length of stay. Patient Instructions: There are no discharge medications for this patient. Disposition at Discharge: Home or self care    Condition at Discharge: Stable    Reference my discharge instructions. Follow-up Appointments   Procedures    FOLLOW UP VISIT Appointment in: Other (Specify) With Dr. Alanis Chavis in 2-3 days for blood pressure check and 6 weeks for postpartum check     With Dr. Alanis Chavis in 2-3 days for blood pressure check and 6 weeks for postpartum check     Standing Status:   Standing     Number of Occurrences:   1     Order Specific Question:   Appointment in     Answer:    Other (Specify)        Signed By:  Deng Landis MD     2022

## 2022-01-23 NOTE — PROGRESS NOTES
1915: Bedside shift change report given to ELMA Noland RN by [address], [address] [facility] turned over at this time. Hourly Rounding performed by RN.   RADHIKA Santos

## 2022-01-23 NOTE — PROGRESS NOTES
Post-Operative  Day 2    Radha Miller       Assessment: Post-Op day 2, doing well s/p [facility] for [facility] with severe range BPs postpartum s/p  Magnesium for seizure prophylaxis for 24 hours and with normal to mild range BPs on Labetalol 200 mg BID    Plan:   - Discharge home today. - Follow up in office in 6 week(s) with [facility] for postpartum check  - Hypertension- normal to mild range BPs on Labetalol 200 mg BID- will continue on Labetalol and she will call with any elevated BPs at home or any symptoms. Follow up in office in 2-3 days for BP check  - Pain medication prescription(s) sent. - Questions answered. Information for the patient's :  Grzegorz Bergacy [[id number]]   , Low Transverse    Patient doing well without significant complaint. Tolerating regular diet. Ambulating. Voiding without difficulty. She denies H/A or vision changes. No CP/SOB    Vitals:  Visit Vitals  BP (!) 159/77 (BP 1 Location: Left upper arm, BP Patient Position: Sitting)   Pulse 93   Temp 98.1 °F (36.7 °C)   Resp 16   Ht 5' 2\" (1.575 m)   Wt 87.1 kg (192 lb)   SpO2 98%   Breastfeeding Unknown   BMI 35.12 kg/m²     Temp (24hrs), Av.2 °F (36.8 °C), Min:97.5 °F (36.4 °C), Max:98.7 °F (37.1 °C)        Exam:       Patient without distress. Fundus firm, nontender per nursing fundal checks. Bandage removed. Clean, dry, intact. Perineum with normal lochia noted per nursing assessment. Lower extremities are negative for pathological edema.     Labs:   Lab Results   Component Value Date/Time    WBC 14.2 (H) 2022 08:26 AM    WBC 11.8 (H) 2022 04:10 AM    WBC 11.1 (H) 2022 08:28 PM    WBC 8.6 2022 10:39 AM    WBC 6.9 2017 03:43 AM    WBC 7.2 06/15/2017 06:20 AM    WBC 8.7 2015 01:55 PM    WBC 14.9 (H) 2013 06:30 AM    WBC 11.5 (H) 2013 10:45 AM    WBC 5.3 2012 09:30 AM    HGB 8.2 (L) 01/23/2022 08:26 AM    HGB 8.6 (L) 01/22/2022 04:10 AM    HGB 8.6 (L) 01/21/2022 08:28 PM    HGB 10.2 (L) 01/21/2022 10:39 AM    HGB 9.3 (L) 06/16/2017 03:43 AM    HGB 11.6 06/15/2017 06:20 AM    HGB 13.9 11/02/2015 01:55 PM    HGB 9.3 (L) 08/17/2013 06:30 AM    HGB 12.0 08/16/2013 10:45 AM    HGB 12.6 12/14/2012 09:30 AM    HCT 25.0 (L) 01/23/2022 08:26 AM    HCT 26.5 (L) 01/22/2022 04:10 AM    HCT 27.4 (L) 01/21/2022 08:28 PM    HCT 31.4 (L) 01/21/2022 10:39 AM    HCT 28.4 (L) 06/16/2017 03:43 AM    HCT 34.3 (L) 06/15/2017 06:20 AM    HCT 40.2 11/02/2015 01:55 PM    HCT 27.6 (L) 08/17/2013 06:30 AM    HCT 34.8 (L) 08/16/2013 10:45 AM    HCT 36.4 12/14/2012 09:30 AM    PLATELET 400 74/74/8162 08:26 AM    PLATELET 565 (L) 09/58/0201 04:10 AM    PLATELET 731 (L) 21/51/1955 08:28 PM    PLATELET 914 78/02/5538 10:39 AM    PLATELET 853 (L) 50/29/2114 03:43 AM    PLATELET 435 15/95/7003 06:20 AM    PLATELET 399 20/76/7995 01:55 PM    PLATELET 86 (L) 68/58/6284 06:30 AM    PLATELET 422 (L) 42/42/6280 10:45 AM    PLATELET 436 80/90/3159 09:30 AM       Recent Results (from the past 24 hour(s))   CBC W/O DIFF    Collection Time: 01/23/22  8:26 AM   Result Value Ref Range    WBC 14.2 (H) 3.6 - 11.0 K/uL    RBC 3.08 (L) 3.80 - 5.20 M/uL    HGB 8.2 (L) 11.5 - 16.0 g/dL    HCT 25.0 (L) 35.0 - 47.0 %    MCV 81.2 80.0 - 99.0 FL    MCH 26.6 26.0 - 34.0 PG    MCHC 32.8 30.0 - 36.5 g/dL    RDW 16.9 (H) 11.5 - 14.5 %    PLATELET 400 402 - 597 K/uL    MPV 12.2 8.9 - 12.9 FL    NRBC 0.1 (H) 0  WBC    ABSOLUTE NRBC 0.02 (H) 0.00 - 0.01 K/uL

## 2022-03-19 PROBLEM — O13.9 GESTATIONAL HYPERTENSION: Status: ACTIVE | Noted: 2022-01-21

## 2022-04-10 NOTE — ANESTHESIA PROCEDURE NOTES
Spinal Block    Start time: 6/15/2017 8:07 AM  End time: 6/15/2017 8:17 AM  Performed by: Dk Huerta by: Zachery Avila:   Indications: at surgeon's request, post-op pain management and primary anesthetic  Preanesthetic Checklist: patient identified, risks and benefits discussed, anesthesia consent, site marked, patient being monitored and timeout performed      Spinal Block:   Patient Position:  Seated  Prep Region:  Lumbar  Prep: chlorhexidine      Location:  L3-4  Technique:  Single shot  Local:  Lidocaine 2%  Local Dose (mL):  3    Needle:   Needle Type:  Pencan  Needle Gauge:  25 G  Attempts:  1      Events: CSF confirmed, no blood with aspiration and no paresthesia        Assessment:  Insertion:  Uncomplicated  Patient tolerance:  Patient tolerated the procedure well with no immediate complications No

## 2022-12-27 NOTE — OP NOTES
Operative Note    Name: Antwon Perkins   Medical Record Number: [id number]   YOB: 1990  Today's Date: 2022      Pre-operative Diagnosis: 37 weeks gestation, Gestational Hypertension, Previous  Section x 2 desiring Repeat     Post-operative Diagnosis: same    Operation: Repeat Low Transverse  Section    Surgeon(s):  MD Carin Mchugh MD    Anesthesia: Spinal    Prophylactic Antibiotics: Ancef  DVT Prophylaxis: Sequential Compression Devices         Fetal Description: yanez     Birth Information:   Information for the patient's :  Leanne Veliz [[id number]]   Delivery of a 3.24 kg male infant on 2022 at 2:22 PM. Apgars were 8  and 9 . Umbilical Cord: 3 Vessels     Umbilical Cord Events:       Placenta:   removal with   appearance. Amniotic Fluid Volume:        Amniotic Fluid Description:           Placenta:  manual removal    Specimens: none           Complications:  none    EBL: 700 cc    Procedure Detail:      After proper patient identification and consent, the patient was taken to the operating room, where spinal anesthesia was administered and found to be adequate. Mijares catheter had been placed using sterile technique. The patient was prepped and draped in the normal sterile fashion with SCDs in place. The abdomen was entered using the Pfannenstiel technique. The peritoneum was entered sharply well superior to the bladder without any apparent injury. The Justyn self retaining retractor was placed. The bladder flap was created without difficulty. A low transverse uterine incision was made with the scalpel and extended with blunt finger dissection. The babys head was then delivered atraumatically. The nose and mouth were suctioned. The cord was clamped and cut and the baby was handed off to Nursing staff in attendance. Placenta was then removed from the uterus.  The uterus was curettaged with a moist No answer, will try again later   lap pad and cleared of all clots and debris. The uterine incision was closed with 0 vicryl, double layer  in running locking fashion with good hemostasis assured. The anterior pelvis was irrigated with warm normal saline and good hemostasis was again reassured throughout. The retractor was removed. The peritoneum was reapproximated with 3-0 Vicryl. The fascia was closed with 0 Maxon in a running fashion. Good hemostasis was assured. The skin was closed with absorbable staples in a subcuticular fashion. The patient tolerated the procedure well. Sponge, lap, and needle counts were correct times three and the patient and baby were taken to recovery/postpartum room in stable condition.     Deng Landis MD  January 21, 2022  3:27 PM

## (undated) DEVICE — SOLIDIFIER FLD 2OZ 1500CC N DISINF IN BTL DISP SAFESORB

## (undated) DEVICE — DEVON™ KNEE AND BODY STRAP 60" X 3" (1.5 M X 7.6 CM): Brand: DEVON

## (undated) DEVICE — SUTURE MCRYL SZ 0 L36IN ABSRB UD L36MM CT-1 1/2 CIR Y946H

## (undated) DEVICE — 3000CC GUARDIAN II: Brand: GUARDIAN

## (undated) DEVICE — Z DISCONTINUED GLOVE SURG SZ 7 L12IN FNGR THK13MIL WHT ISOLEX POLYISOPRENE

## (undated) DEVICE — SUTURE VCRL SZ 0 L36IN ABSRB VLT L40MM CT 1/2 CIR J358H

## (undated) DEVICE — STAPLER SKIN SQ 30 ABSRB STPL DISP INSORB

## (undated) DEVICE — SOLUTION IV 1000ML 0.9% SOD CHL

## (undated) DEVICE — TIP CLEANER: Brand: VALLEYLAB

## (undated) DEVICE — LARGE, DISPOSABLE ALEXIS O C-SECTION PROTECTOR - RETRACTOR: Brand: ALEXIS ® O C-SECTION PROTECTOR - RETRACTOR

## (undated) DEVICE — PENCIL ES L3M BTTN SWCH S STL HEX LOK BLDE ELECTRD HOLSTER

## (undated) DEVICE — (D)PREP SKN CHLRAPRP APPL 26ML -- CONVERT TO ITEM 371833

## (undated) DEVICE — SUTURE VCRL SZ 2-0 L36IN ABSRB VLT L36MM CT-1 1/2 CIR J345H

## (undated) DEVICE — ADHESIVE TISS DERMA FLEX 0.7ML -- HIGH VISCOSITY

## (undated) DEVICE — SUTURE MCRYL SZ 0 L36IN ABSRB VLT L48MM CTX 1/2 CIR Y398H

## (undated) DEVICE — MEDI-VAC NON-CONDUCTIVE SUCTION TUBING: Brand: CARDINAL HEALTH

## (undated) DEVICE — STERILE POLYISOPRENE POWDER-FREE SURGICAL GLOVES WITH EMOLLIENT COATING: Brand: PROTEXIS

## (undated) DEVICE — STERILE POLYISOPRENE POWDER-FREE SURGICAL GLOVES: Brand: PROTEXIS

## (undated) DEVICE — REM POLYHESIVE ADULT PATIENT RETURN ELECTRODE: Brand: VALLEYLAB

## (undated) DEVICE — STRAP,POSITIONING,KNEE/BODY,FOAM,4X60": Brand: MEDLINE

## (undated) DEVICE — PREP SKN CHLRAPRP APL 26ML STR --

## (undated) DEVICE — POOLE SUCTION INSTRUMENT WITH REMOVABLE SHEATH: Brand: POOLE

## (undated) DEVICE — PACK PROCEDURE SURG C SECT KT SMH

## (undated) DEVICE — SOLIDIFIER MEDC 1200ML -- CONVERT TO 356117

## (undated) DEVICE — C-SECTION II-LF: Brand: MEDLINE INDUSTRIES, INC.